# Patient Record
Sex: MALE | Race: WHITE | NOT HISPANIC OR LATINO | Employment: FULL TIME
[De-identification: names, ages, dates, MRNs, and addresses within clinical notes are randomized per-mention and may not be internally consistent; named-entity substitution may affect disease eponyms.]

---
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
## 2018-01-11 ENCOUNTER — COMMUNICATION - HEALTHEAST (OUTPATIENT)
Dept: SCHEDULING | Facility: CLINIC | Age: 18
End: 2018-01-11

## 2018-01-19 ENCOUNTER — OFFICE VISIT - HEALTHEAST (OUTPATIENT)
Dept: INTERNAL MEDICINE | Facility: CLINIC | Age: 18
End: 2018-01-19

## 2018-01-19 DIAGNOSIS — F41.9 ANXIETY: ICD-10-CM

## 2018-01-19 ASSESSMENT — MIFFLIN-ST. JEOR: SCORE: 1749.58

## 2018-01-31 ENCOUNTER — RADIANT APPOINTMENT (OUTPATIENT)
Dept: GENERAL RADIOLOGY | Facility: CLINIC | Age: 18
End: 2018-01-31
Attending: FAMILY MEDICINE
Payer: COMMERCIAL

## 2018-01-31 ENCOUNTER — OFFICE VISIT (OUTPATIENT)
Dept: URGENT CARE | Facility: URGENT CARE | Age: 18
End: 2018-01-31
Payer: COMMERCIAL

## 2018-01-31 VITALS
OXYGEN SATURATION: 99 % | HEIGHT: 72 IN | HEART RATE: 83 BPM | TEMPERATURE: 98.7 F | WEIGHT: 153 LBS | SYSTOLIC BLOOD PRESSURE: 116 MMHG | DIASTOLIC BLOOD PRESSURE: 68 MMHG | BODY MASS INDEX: 20.72 KG/M2

## 2018-01-31 DIAGNOSIS — F41.9 ANXIETY: ICD-10-CM

## 2018-01-31 DIAGNOSIS — R10.13 ABDOMINAL PAIN, EPIGASTRIC: Primary | ICD-10-CM

## 2018-01-31 DIAGNOSIS — R10.13 ABDOMINAL PAIN, EPIGASTRIC: ICD-10-CM

## 2018-01-31 LAB
BASOPHILS # BLD AUTO: 0 10E9/L (ref 0–0.2)
BASOPHILS NFR BLD AUTO: 0.2 %
DIFFERENTIAL METHOD BLD: NORMAL
EOSINOPHIL # BLD AUTO: 0.1 10E9/L (ref 0–0.7)
EOSINOPHIL NFR BLD AUTO: 1.3 %
ERYTHROCYTE [DISTWIDTH] IN BLOOD BY AUTOMATED COUNT: 11.7 % (ref 10–15)
ERYTHROCYTE [SEDIMENTATION RATE] IN BLOOD BY WESTERGREN METHOD: 5 MM/H (ref 0–15)
HCT VFR BLD AUTO: 43.7 % (ref 40–53)
HGB BLD-MCNC: 15 G/DL (ref 13.3–17.7)
LYMPHOCYTES # BLD AUTO: 2.9 10E9/L (ref 0.8–5.3)
LYMPHOCYTES NFR BLD AUTO: 31.1 %
MCH RBC QN AUTO: 29.6 PG (ref 26.5–33)
MCHC RBC AUTO-ENTMCNC: 34.3 G/DL (ref 31.5–36.5)
MCV RBC AUTO: 86 FL (ref 78–100)
MONOCYTES # BLD AUTO: 0.8 10E9/L (ref 0–1.3)
MONOCYTES NFR BLD AUTO: 8.2 %
NEUTROPHILS # BLD AUTO: 5.4 10E9/L (ref 1.6–8.3)
NEUTROPHILS NFR BLD AUTO: 59.2 %
PLATELET # BLD AUTO: 337 10E9/L (ref 150–450)
RBC # BLD AUTO: 5.06 10E12/L (ref 4.4–5.9)
WBC # BLD AUTO: 9.2 10E9/L (ref 4–11)

## 2018-01-31 PROCEDURE — 83690 ASSAY OF LIPASE: CPT | Performed by: FAMILY MEDICINE

## 2018-01-31 PROCEDURE — 74019 RADEX ABDOMEN 2 VIEWS: CPT

## 2018-01-31 PROCEDURE — 99203 OFFICE O/P NEW LOW 30 MIN: CPT | Performed by: FAMILY MEDICINE

## 2018-01-31 PROCEDURE — 80050 GENERAL HEALTH PANEL: CPT | Performed by: FAMILY MEDICINE

## 2018-01-31 PROCEDURE — 36415 COLL VENOUS BLD VENIPUNCTURE: CPT | Performed by: FAMILY MEDICINE

## 2018-01-31 PROCEDURE — 85652 RBC SED RATE AUTOMATED: CPT | Performed by: FAMILY MEDICINE

## 2018-01-31 RX ORDER — CALCIUM CARBONATE 500 MG/1
1 TABLET, CHEWABLE ORAL DAILY
COMMUNITY

## 2018-01-31 NOTE — MR AVS SNAPSHOT
After Visit Summary   1/31/2018    Glen Cardenas    MRN: [id number]           Patient Information     Date Of Birth          2000        Visit Information        Provider Department      1/31/2018 5:05 PM Janae Ritter MD [facility]        Today's Diagnoses     Abdominal pain, epigastric    -  1    Anxiety          Care Instructions      Epigastric Pain (Uncertain Cause)     Epigastric pain can be a sign of disease in the upper abdomen. Common causes include:    Acid reflux (stomach acid flowing up into the esophagus)    Gastritis (irritation of the stomach lining)    Peptic Ulcer Disease    Inflammation of the pancreas    Gallstone    Infection in the gallbladder  Pain may be dull or burning. It may spread upward to the chest or to the back. There may be other symptoms such as belching, bloating, cramps or hunger pains. There may be weight loss or poor appetite, nausea or vomiting.  Since the diagnosis of your pain is not certain yet, further tests may sometimes be needed. Sometimes the doctor will treat you for the most likely condition to see if there is improvement before doing further tests.  Home care  Medicines    Antacids help neutralize the normal acids in your stomach. Examples are Maalox, Mylanta, Rolaids, and Tums. If you don t like the liquid, you can also try a chewable one. You may find one works better than another for you. Overuse can cause diarrhea or constipation.    Acid blockers (H2 blockers) decrease acid production. Examples are cimetidine (Tagamet), famotidine (Pepcid) and ranitidine (Zantac).    Acid inhibitors (PPIs) decrease acid production in a different way than the blockers. You may find they work better, but can take a little longer to take effect.  Examples are omeprazole (Prilosec), lansoprazole (Prevacid), pantoprazole (Protonix), rabeprazole (Aciphex), and esomeprazole (Nexium).    Take an antacid 30-60 minutes after  eating and at bedtime, but not at the same time as an acid blocker.    Try not to take NSAIDs. Aspirin may also cause problems, but if taking it for your heart or other medical reasons, talk to your doctor before stopping it; you do not want to cause a worse problem, like a heart attack or stroke.  Diet    If certain foods seem to cause your spasm, try to avoid them.     Eat slowly and chew food well before swallowing. Symptoms of gastritis can be worsened by certain foods. Limit or avoid fatty, fried, and spicy foods, as well as coffee, chocolate, mint, and foods with high acid content such as tomatoes and citrus fruit and juices (orange, grapefruit, lemon).    Avoid alcohol, caffeine, and tobacco, which can delay healing and worsen your problem.    Try eating smaller meals with snacks in between  Follow-up care  Follow up with your healthcare provider or as advised.  When to seek medical advice  Call your healthcare provider right away if any of the following occur:    Stomach pain worsens or moves to the right lower part of the abdomen    Chest pain appears, or if it worsens or spreads to the chest, back, neck, shoulder, or arm    Frequent vomiting (can t keep down liquids)    Blood in the stool or vomit (red or black color)    Feeling weak or dizzy, fainting, or having trouble breathing    Fever of 100.4 F (38 C) or higher, or as directed by your healthcare provider    Abdominal swelling  Date Last Reviewed: 9/25/2015 2000-2017 [employer]. [address] [place] [address]. All rights reserved. This information is not intended as a substitute for professional medical care. Always follow your healthcare professional's instructions.                Follow-ups after your visit        Follow-up notes from your care team     Return if symptoms worsen or fail to improve.      Who to contact     If you have questions or need follow up information about today's clinic visit or your schedule  "please contact [facility] directly at [phone].  Normal or non-critical lab and imaging results will be communicated to you by MyChart, letter or phone within 4 business days after the clinic has received the results. If you do not hear from us within 7 days, please contact the clinic through MyChart or phone. If you have a critical or abnormal lab result, we will notify you by phone as soon as possible.  Submit refill requests through [employer] or call your pharmacy and they will forward the refill request to us. Please allow 3 business days for your refill to be completed.          Additional Information About Your Visit        [facility] [employer] [employer] Information     [employer] lets you send messages to your doctor, view your test results, renew your prescriptions, schedule appointments and more. To sign up, go to [email or web address] . Click on \"Log in\" on the left side of the screen, which will take you to the Welcome page. Then click on \"Sign up Now\" on the right side of the page.     You will be asked to enter the access code listed below, as well as some personal information. Please follow the directions to create your username and password.     Your access code is: [email or web address]  Expires: 2018  6:51 PM     Your access code will  in 90 days. If you need help or a new code, please call your [place] clinic or [phone].        Care EveryWhere ID     This is your Care EveryWhere ID. This could be used by other organizations to access your [place] medical records  [id number]        Your Vitals Were     Pulse Temperature Height Pulse Oximetry BMI (Body Mass Index)       83 98.7  F (37.1  C) (Oral) 6' (1.829 m) 99% 20.75 kg/m2        Blood Pressure from Last 3 Encounters:   18 116/68    Weight from Last 3 Encounters:   18 153 lb (69.4 kg) (50 %)*   09 71 lb (32.2 kg) (41 %)*   08 63 lb (28.6 kg) (38 %)*     * Growth percentiles are based on CDC 2-20 Years data.    "           We Performed the Following     CBC with platelets and differential     Comprehensive metabolic panel (BMP + Alb, Alk Phos, ALT, AST, Total. Bili, TP)     Lipase          Today's Medication Changes          These changes are accurate as of 1/31/18  6:51 PM.  If you have any questions, ask your nurse or doctor.               Start taking these medicines.        Dose/Directions    ranitidine 150 MG tablet   Commonly known as:  ZANTAC   Used for:  Abdominal pain, epigastric   Started by:  Janae Ritter MD        Dose:  150 mg   Take 1 tablet (150 mg) by mouth 2 times daily   Quantity:  60 tablet   Refills:  1            Where to get your medicines      These medications were sent to [facility] [address] [address] [place] [address]  [address] [facility] [address] [address]     Phone:  [phone]     ranitidine 150 MG tablet                Primary Care Provider Office Phone # Fax #    Edwin FLETCHER Yannick [phone]       [address]   [facility] [address]        Equal Access to Services     [facility]: Hadii aad ku hadasho Soomaali, waaxda luqadaha, qaybta kaalmada adeegyada, waxay america hayiam robb . So [facility] [phone].    ATENCIÓN: Si habla español, tiene a berman disposición servicios gratuitos de asistencia lingüística. Llame al [phone].    We comply with applicable federal civil rights laws and [place] laws. We do not discriminate on the basis of race, color, national origin, age, disability, sex, sexual orientation, or gender identity.            Thank you!     Thank you for choosing [facility]  for your care. Our goal is always to provide you with excellent care. Hearing back from our patients is one way we can continue to improve our services. Please take a few minutes to complete the written survey that you may receive in the mail after your visit with us.  Thank you!             Your Updated Medication List - Protect others around you: Learn how to safely use, store and throw away your medicines at www.disposemymeds.org.          This list is accurate as of 1/31/18  6:51 PM.  Always use your most recent med list.                   Brand Name Dispense Instructions for use Diagnosis    calcium carbonate 500 MG chewable tablet    TUMS     Take 1 chew tab by mouth daily        CITALOPRAM HYDROBROMIDE PO           ranitidine 150 MG tablet    ZANTAC    60 tablet    Take 1 tablet (150 mg) by mouth 2 times daily    Abdominal pain, epigastric

## 2018-01-31 NOTE — PROGRESS NOTES
(S) Glen Cardenas is a 17 year old male with complaint of gastrointestinal symptoms of pain in the entire abdomen for 4 weeks.  At first it began in the LLQ and now, it has settled into the upper abdomen.  The pain does worsen after eating.  The stool has occasionally been loose but sometimes he is constipated.  He has a lot of anxiety related to the relationship with his father and step-mother.  Was just started on Celexa 2 days ago. No blood in stool.  Normal UO. No h/o abdominal surgeries.  No out of country travels or recent antibiotics. He does drink coffee in the mornings.  Does drink some alcohol here and there but not excessive.  Also, does smoke on occasion.        (O) /68  Pulse 83  Temp 98.7  F (37.1  C) (Oral)  Ht 6' (1.829 m)  Wt 153 lb (69.4 kg)  SpO2 99%  BMI 20.75 kg/m2   Physical exam reveals the patient appears well. Hydration status: well hydrated. HEENT: normal.  Neck: supple, no adenopathy.  Lungs: CTA B.  CV: RRR, normal S1, S2, no murmurs.  Abdomen: +BS. abdomen is soft with mild epigastric tenderness but no masses, organomegaly or guarding.  No rebound.      Labs:   Results for orders placed or performed in visit on 01/31/18   CBC with platelets and differential   Result Value Ref Range    WBC 9.2 4.0 - 11.0 10e9/L    RBC Count 5.06 4.4 - 5.9 10e12/L    Hemoglobin 15.0 13.3 - 17.7 g/dL    Hematocrit 43.7 40.0 - 53.0 %    MCV 86 78 - 100 fl    MCH 29.6 26.5 - 33.0 pg    MCHC 34.3 31.5 - 36.5 g/dL    RDW 11.7 10.0 - 15.0 %    Platelet Count 337 150 - 450 10e9/L    Diff Method Automated Method     % Neutrophils 59.2 %    % Lymphocytes 31.1 %    % Monocytes 8.2 %    % Eosinophils 1.3 %    % Basophils 0.2 %    Absolute Neutrophil 5.4 1.6 - 8.3 10e9/L    Absolute Lymphocytes 2.9 0.8 - 5.3 10e9/L    Absolute Monocytes 0.8 0.0 - 1.3 10e9/L    Absolute Eosinophils 0.1 0.0 - 0.7 10e9/L    Absolute Basophils 0.0 0.0 - 0.2 10e9/L   **ESR FUTURE anytime   Result Value Ref Range    Sed  Rate 5 0 - 15 mm/h        Abdomen X-rays 2 views: NAD    (A) Abdominal pain, epigastric and Anxiety    (P) I have recommended bland diet and avoid gastritis triggers.  He is to avoid alcohol, coffee and smoking.  Zantac as per orders.  Await pending labs.  Return office visit with PCP if symptoms persist or worsen; I have alerted the patient to call if high fever, dehydration, marked weakness, fainting, increased abdominal pain, blood in stool or vomit.  Janae Rutherford MD

## 2018-01-31 NOTE — NURSING NOTE
Chief Complaint   Patient presents with     Urgent Care     Abdominal Pain     Reports initially intermittent, recently constant pain initially located in LLQ of abdomen, now generalized from epigastric area to RLQ and below umbilicus as well.  Rates pain 4 - 8/10.  Reports eating makes pain worse.  Has had intermittent burping/diarrhea/constipation/excessive intestinal gas.  Mom notes had acid reflux as baby and again a few years ago for which he took OTC medication.  Does have appendix and gallbladder.     Abdominal Pain     Reports decreased appetite and feels has lost weight recently.     Initial /68  Pulse 83  Temp 98.7  F (37.1  C) (Oral)  Ht 6' (1.829 m)  Wt 153 lb (69.4 kg)  SpO2 99%  BMI 20.75 kg/m2 Estimated body mass index is 20.75 kg/(m^2) as calculated from the following:    Height as of this encounter: 6' (1.829 m).    Weight as of this encounter: 153 lb (69.4 kg)..  BP completed using cuff size: small viktoria De Los Santos RN

## 2018-02-01 LAB
ALBUMIN SERPL-MCNC: 5 G/DL (ref 3.4–5)
ALP SERPL-CCNC: 121 U/L (ref 65–260)
ALT SERPL W P-5'-P-CCNC: 11 U/L (ref 0–50)
ANION GAP SERPL CALCULATED.3IONS-SCNC: 10 MMOL/L (ref 3–14)
AST SERPL W P-5'-P-CCNC: 18 U/L (ref 0–35)
BILIRUB SERPL-MCNC: 0.4 MG/DL (ref 0.2–1.3)
BUN SERPL-MCNC: 16 MG/DL (ref 7–30)
CALCIUM SERPL-MCNC: 9.7 MG/DL (ref 8.5–10.1)
CHLORIDE SERPL-SCNC: 102 MMOL/L (ref 98–110)
CO2 SERPL-SCNC: 25 MMOL/L (ref 20–32)
CREAT SERPL-MCNC: 0.83 MG/DL (ref 0.5–1)
GFR SERPL CREATININE-BSD FRML MDRD: >90 ML/MIN/1.7M2
GLUCOSE SERPL-MCNC: 75 MG/DL (ref 70–99)
LIPASE SERPL-CCNC: 94 U/L (ref 73–393)
POTASSIUM SERPL-SCNC: 3.7 MMOL/L (ref 3.4–5.3)
PROT SERPL-MCNC: 8 G/DL (ref 6.8–8.8)
SODIUM SERPL-SCNC: 137 MMOL/L (ref 133–144)
TSH SERPL DL<=0.005 MIU/L-ACNC: 2.04 MU/L (ref 0.4–4)

## 2018-02-01 NOTE — PATIENT INSTRUCTIONS
Epigastric Pain (Uncertain Cause)     Epigastric pain can be a sign of disease in the upper abdomen. Common causes include:    Acid reflux (stomach acid flowing up into the esophagus)    Gastritis (irritation of the stomach lining)    Peptic Ulcer Disease    Inflammation of the pancreas    Gallstone    Infection in the gallbladder  Pain may be dull or burning. It may spread upward to the chest or to the back. There may be other symptoms such as belching, bloating, cramps or hunger pains. There may be weight loss or poor appetite, nausea or vomiting.  Since the diagnosis of your pain is not certain yet, further tests may sometimes be needed. Sometimes the doctor will treat you for the most likely condition to see if there is improvement before doing further tests.  Home care  Medicines    Antacids help neutralize the normal acids in your stomach. Examples are Maalox, Mylanta, Rolaids, and Tums. If you don t like the liquid, you can also try a chewable one. You may find one works better than another for you. Overuse can cause diarrhea or constipation.    Acid blockers (H2 blockers) decrease acid production. Examples are cimetidine (Tagamet), famotidine (Pepcid) and ranitidine (Zantac).    Acid inhibitors (PPIs) decrease acid production in a different way than the blockers. You may find they work better, but can take a little longer to take effect.  Examples are omeprazole (Prilosec), lansoprazole (Prevacid), pantoprazole (Protonix), rabeprazole (Aciphex), and esomeprazole (Nexium).    Take an antacid 30-60 minutes after eating and at bedtime, but not at the same time as an acid blocker.    Try not to take NSAIDs. Aspirin may also cause problems, but if taking it for your heart or other medical reasons, talk to your doctor before stopping it; you do not want to cause a worse problem, like a heart attack or stroke.  Diet    If certain foods seem to cause your spasm, try to avoid them.     Eat slowly and chew food well  before swallowing. Symptoms of gastritis can be worsened by certain foods. Limit or avoid fatty, fried, and spicy foods, as well as coffee, chocolate, mint, and foods with high acid content such as tomatoes and citrus fruit and juices (orange, grapefruit, lemon).    Avoid alcohol, caffeine, and tobacco, which can delay healing and worsen your problem.    Try eating smaller meals with snacks in between  Follow-up care  Follow up with your healthcare provider or as advised.  When to seek medical advice  Call your healthcare provider right away if any of the following occur:    Stomach pain worsens or moves to the right lower part of the abdomen    Chest pain appears, or if it worsens or spreads to the chest, back, neck, shoulder, or arm    Frequent vomiting (can t keep down liquids)    Blood in the stool or vomit (red or black color)    Feeling weak or dizzy, fainting, or having trouble breathing    Fever of 100.4 F (38 C) or higher, or as directed by your healthcare provider    Abdominal swelling  Date Last Reviewed: 9/25/2015 2000-2017 [employer]. [address] [place] [address], [place] [address]. All rights reserved. This information is not intended as a substitute for professional medical care. Always follow your healthcare professional's instructions.

## 2018-02-22 ENCOUNTER — RECORDS - HEALTHEAST (OUTPATIENT)
Dept: ADMINISTRATIVE | Facility: OTHER | Age: 18
End: 2018-02-22

## 2018-02-28 ENCOUNTER — COMMUNICATION - HEALTHEAST (OUTPATIENT)
Dept: INTERNAL MEDICINE | Facility: CLINIC | Age: 18
End: 2018-02-28

## 2018-02-28 DIAGNOSIS — F41.9 ANXIETY: ICD-10-CM

## 2018-03-06 ENCOUNTER — RECORDS - HEALTHEAST (OUTPATIENT)
Dept: ADMINISTRATIVE | Facility: OTHER | Age: 18
End: 2018-03-06

## 2018-03-19 ENCOUNTER — RECORDS - HEALTHEAST (OUTPATIENT)
Dept: ADMINISTRATIVE | Facility: OTHER | Age: 18
End: 2018-03-19

## 2018-03-30 ENCOUNTER — COMMUNICATION - HEALTHEAST (OUTPATIENT)
Dept: TELEHEALTH | Facility: CLINIC | Age: 18
End: 2018-03-30

## 2018-03-30 ENCOUNTER — OFFICE VISIT - HEALTHEAST (OUTPATIENT)
Dept: INTERNAL MEDICINE | Facility: CLINIC | Age: 18
End: 2018-03-30

## 2018-03-30 DIAGNOSIS — R53.83 FATIGUE: ICD-10-CM

## 2018-03-30 DIAGNOSIS — F41.9 ANXIETY: ICD-10-CM

## 2018-03-30 LAB
ALBUMIN SERPL-MCNC: 4.2 G/DL (ref 3.5–5)
ALP SERPL-CCNC: 107 U/L (ref 45–120)
ALT SERPL W P-5'-P-CCNC: <9 U/L (ref 0–45)
ANION GAP SERPL CALCULATED.3IONS-SCNC: 10 MMOL/L (ref 5–18)
AST SERPL W P-5'-P-CCNC: 15 U/L (ref 0–40)
BILIRUB SERPL-MCNC: 0.8 MG/DL (ref 0–1)
BUN SERPL-MCNC: 14 MG/DL (ref 8–22)
C REACTIVE PROTEIN LHE: <0.1 MG/DL (ref 0–0.8)
CALCIUM SERPL-MCNC: 10 MG/DL (ref 8.5–10.5)
CHLORIDE BLD-SCNC: 102 MMOL/L (ref 98–107)
CO2 SERPL-SCNC: 25 MMOL/L (ref 22–31)
CREAT SERPL-MCNC: 0.77 MG/DL (ref 0.7–1.3)
ERYTHROCYTE [DISTWIDTH] IN BLOOD BY AUTOMATED COUNT: 12.2 % (ref 11–14.5)
ERYTHROCYTE [SEDIMENTATION RATE] IN BLOOD BY WESTERGREN METHOD: 4 MM/HR (ref 0–15)
GFR SERPL CREATININE-BSD FRML MDRD: >60 ML/MIN/1.73M2
GLUCOSE BLD-MCNC: 77 MG/DL (ref 70–125)
HCT VFR BLD AUTO: 42.3 % (ref 40–54)
HGB BLD-MCNC: 14.5 G/DL (ref 14–18)
MCH RBC QN AUTO: 29.7 PG (ref 27–34)
MCHC RBC AUTO-ENTMCNC: 34.3 G/DL (ref 32–36)
MCV RBC AUTO: 87 FL (ref 80–100)
MONOCYTES NFR BLD AUTO: NEGATIVE %
PLATELET # BLD AUTO: 293 THOU/UL (ref 140–440)
PMV BLD AUTO: 6.9 FL (ref 7–10)
POTASSIUM BLD-SCNC: 4.3 MMOL/L (ref 3.5–5)
PROT SERPL-MCNC: 7.4 G/DL (ref 6–8)
RBC # BLD AUTO: 4.88 MILL/UL (ref 4.4–6.2)
SODIUM SERPL-SCNC: 137 MMOL/L (ref 136–145)
TSH SERPL DL<=0.005 MIU/L-ACNC: 1.15 UIU/ML (ref 0.3–5)
WBC: 5.8 THOU/UL (ref 4–11)

## 2018-04-02 LAB
25(OH)D3 SERPL-MCNC: 26.1 NG/ML (ref 30–80)
25(OH)D3 SERPL-MCNC: 26.1 NG/ML (ref 30–80)

## 2018-04-10 ENCOUNTER — RECORDS - HEALTHEAST (OUTPATIENT)
Dept: ADMINISTRATIVE | Facility: OTHER | Age: 18
End: 2018-04-10

## 2018-07-31 ENCOUNTER — OFFICE VISIT - HEALTHEAST (OUTPATIENT)
Dept: INTERNAL MEDICINE | Facility: CLINIC | Age: 18
End: 2018-07-31

## 2018-07-31 DIAGNOSIS — J01.00 ACUTE NON-RECURRENT MAXILLARY SINUSITIS: ICD-10-CM

## 2018-08-01 ENCOUNTER — COMMUNICATION - HEALTHEAST (OUTPATIENT)
Dept: INTERNAL MEDICINE | Facility: CLINIC | Age: 18
End: 2018-08-01

## 2018-08-24 ENCOUNTER — COMMUNICATION - HEALTHEAST (OUTPATIENT)
Dept: SCHEDULING | Facility: CLINIC | Age: 18
End: 2018-08-24

## 2018-09-29 ENCOUNTER — COMMUNICATION - HEALTHEAST (OUTPATIENT)
Dept: INTERNAL MEDICINE | Facility: CLINIC | Age: 18
End: 2018-09-29

## 2018-09-29 DIAGNOSIS — F41.9 ANXIETY: ICD-10-CM

## 2019-03-27 ENCOUNTER — OFFICE VISIT - HEALTHEAST (OUTPATIENT)
Dept: FAMILY MEDICINE | Facility: CLINIC | Age: 19
End: 2019-03-27

## 2019-03-27 DIAGNOSIS — T50.905A ADVERSE EFFECT OF DRUG, INITIAL ENCOUNTER: ICD-10-CM

## 2019-04-27 ENCOUNTER — COMMUNICATION - HEALTHEAST (OUTPATIENT)
Dept: FAMILY MEDICINE | Facility: CLINIC | Age: 19
End: 2019-04-27

## 2019-04-27 DIAGNOSIS — T50.905A ADVERSE EFFECT OF DRUG, INITIAL ENCOUNTER: ICD-10-CM

## 2019-06-05 ENCOUNTER — COMMUNICATION - HEALTHEAST (OUTPATIENT)
Dept: FAMILY MEDICINE | Facility: CLINIC | Age: 19
End: 2019-06-05

## 2019-06-05 ENCOUNTER — RECORDS - HEALTHEAST (OUTPATIENT)
Dept: ADMINISTRATIVE | Facility: OTHER | Age: 19
End: 2019-06-05

## 2019-06-05 DIAGNOSIS — T50.905A ADVERSE EFFECT OF DRUG, INITIAL ENCOUNTER: ICD-10-CM

## 2019-06-09 ENCOUNTER — COMMUNICATION - HEALTHEAST (OUTPATIENT)
Dept: SCHEDULING | Facility: CLINIC | Age: 19
End: 2019-06-09

## 2019-06-10 ENCOUNTER — AMBULATORY - HEALTHEAST (OUTPATIENT)
Dept: INTERNAL MEDICINE | Facility: CLINIC | Age: 19
End: 2019-06-10

## 2019-06-10 DIAGNOSIS — T50.905A ADVERSE EFFECT OF DRUG, INITIAL ENCOUNTER: ICD-10-CM

## 2019-06-11 ENCOUNTER — COMMUNICATION - HEALTHEAST (OUTPATIENT)
Dept: INTERNAL MEDICINE | Facility: CLINIC | Age: 19
End: 2019-06-11

## 2019-06-19 ENCOUNTER — RECORDS - HEALTHEAST (OUTPATIENT)
Dept: ADMINISTRATIVE | Facility: OTHER | Age: 19
End: 2019-06-19

## 2019-06-27 ENCOUNTER — OFFICE VISIT - HEALTHEAST (OUTPATIENT)
Dept: INTERNAL MEDICINE | Facility: CLINIC | Age: 19
End: 2019-06-27

## 2019-06-27 DIAGNOSIS — F41.9 ANXIETY: ICD-10-CM

## 2019-07-01 ENCOUNTER — RECORDS - HEALTHEAST (OUTPATIENT)
Dept: ADMINISTRATIVE | Facility: OTHER | Age: 19
End: 2019-07-01

## 2020-03-02 ENCOUNTER — OFFICE VISIT - HEALTHEAST (OUTPATIENT)
Dept: INTERNAL MEDICINE | Facility: CLINIC | Age: 20
End: 2020-03-02

## 2020-03-02 DIAGNOSIS — F41.9 ANXIETY: ICD-10-CM

## 2020-03-02 DIAGNOSIS — Z00.00 ROUTINE GENERAL MEDICAL EXAMINATION AT A HEALTH CARE FACILITY: ICD-10-CM

## 2020-03-02 ASSESSMENT — PATIENT HEALTH QUESTIONNAIRE - PHQ9: SUM OF ALL RESPONSES TO PHQ QUESTIONS 1-9: 7

## 2020-03-02 ASSESSMENT — MIFFLIN-ST. JEOR: SCORE: 1763.19

## 2020-07-26 ENCOUNTER — COMMUNICATION - HEALTHEAST (OUTPATIENT)
Dept: INTERNAL MEDICINE | Facility: CLINIC | Age: 20
End: 2020-07-26

## 2020-07-26 DIAGNOSIS — F41.9 ANXIETY: ICD-10-CM

## 2020-10-13 ENCOUNTER — COMMUNICATION - HEALTHEAST (OUTPATIENT)
Dept: INTERNAL MEDICINE | Facility: CLINIC | Age: 20
End: 2020-10-13

## 2020-10-19 ENCOUNTER — COMMUNICATION - HEALTHEAST (OUTPATIENT)
Dept: INTERNAL MEDICINE | Facility: CLINIC | Age: 20
End: 2020-10-19

## 2020-10-20 ENCOUNTER — OFFICE VISIT - HEALTHEAST (OUTPATIENT)
Dept: INTERNAL MEDICINE | Facility: CLINIC | Age: 20
End: 2020-10-20

## 2020-10-20 DIAGNOSIS — Z23 NEED FOR IMMUNIZATION AGAINST INFLUENZA: ICD-10-CM

## 2020-10-20 DIAGNOSIS — S06.0X0A CONCUSSION WITHOUT LOSS OF CONSCIOUSNESS, INITIAL ENCOUNTER: ICD-10-CM

## 2020-11-05 ENCOUNTER — OFFICE VISIT - HEALTHEAST (OUTPATIENT)
Dept: INTERNAL MEDICINE | Facility: CLINIC | Age: 20
End: 2020-11-05

## 2020-11-05 DIAGNOSIS — S06.0X0A CONCUSSION WITHOUT LOSS OF CONSCIOUSNESS, INITIAL ENCOUNTER: ICD-10-CM

## 2020-11-17 ENCOUNTER — OFFICE VISIT - HEALTHEAST (OUTPATIENT)
Dept: INTERNAL MEDICINE | Facility: CLINIC | Age: 20
End: 2020-11-17

## 2020-11-17 DIAGNOSIS — F41.8 DEPRESSION WITH ANXIETY: ICD-10-CM

## 2020-12-06 ENCOUNTER — OFFICE VISIT (OUTPATIENT)
Dept: URGENT CARE | Facility: URGENT CARE | Age: 20
End: 2020-12-06
Payer: COMMERCIAL

## 2020-12-06 ENCOUNTER — COMMUNICATION - HEALTHEAST (OUTPATIENT)
Dept: SCHEDULING | Facility: CLINIC | Age: 20
End: 2020-12-06

## 2020-12-06 VITALS
OXYGEN SATURATION: 97 % | TEMPERATURE: 97.3 F | WEIGHT: 172 LBS | SYSTOLIC BLOOD PRESSURE: 116 MMHG | DIASTOLIC BLOOD PRESSURE: 81 MMHG | BODY MASS INDEX: 23.33 KG/M2 | HEART RATE: 83 BPM

## 2020-12-06 DIAGNOSIS — F32.2 SEVERE DEPRESSION (H): Primary | ICD-10-CM

## 2020-12-06 PROCEDURE — 99207 PR NO CHARGE LOS: CPT | Performed by: FAMILY MEDICINE

## 2020-12-06 NOTE — PROGRESS NOTES
THIS IS A TRIAGE ENCOUNTER.   Glen Cardenas is a 20 year old male--his primary care provider is Dr. Edwin Lanier at the [facility]-- with a history of depression and anxiety here with his father.  Patient presents with worsening emotional outbursts, anger, severe anxiety and worsening hopelessness, sadness, troubles staying asleep, suicidal ideation without a plan and has not noticed improvement after his Citalopram (Celexa) was increased from 20 mg daily to 40 mg daily on November 18, 2020.  History of self-inflicted head trauma leading to concussion in late September/early October 2020.     Patient will go to the [facility] ED to be evaluated by the Psychiatric Assessment Team .      I told the patient that the patient would not be charged for the brief triage evaluation here at the [facility].     Victoriano Sinha MD

## 2020-12-07 ENCOUNTER — HOSPITAL ENCOUNTER (EMERGENCY)
Facility: CLINIC | Age: 20
Discharge: HOME | End: 2020-12-07
Attending: EMERGENCY MEDICINE | Admitting: EMERGENCY MEDICINE
Payer: COMMERCIAL

## 2020-12-07 VITALS
OXYGEN SATURATION: 99 % | DIASTOLIC BLOOD PRESSURE: 78 MMHG | HEART RATE: 66 BPM | RESPIRATION RATE: 16 BRPM | TEMPERATURE: 97.9 F | SYSTOLIC BLOOD PRESSURE: 139 MMHG

## 2020-12-07 DIAGNOSIS — F41.8 DEPRESSION WITH ANXIETY: ICD-10-CM

## 2020-12-07 PROCEDURE — 99285 EMERGENCY DEPT VISIT HI MDM: CPT | Mod: 25 | Performed by: EMERGENCY MEDICINE

## 2020-12-07 PROCEDURE — 90791 PSYCH DIAGNOSTIC EVALUATION: CPT

## 2020-12-07 PROCEDURE — 99283 EMERGENCY DEPT VISIT LOW MDM: CPT | Performed by: EMERGENCY MEDICINE

## 2020-12-07 NOTE — ED PROVIDER NOTES
ED Provider Note  [facility]      History     Chief Complaint   Patient presents with     Mental Health Problem     feeling down, lots of stressors      The history is provided by the patient and medical records.     Glen Cardenas is a 20 year old male who presents to the Emergency Department with his father with increased depression and anxiety. Patient reports he has been dealing with depression and anxiety for a few years but it has been much worse lately. He states he has been having breakdowns, crying, and has been pacing for hours at times. He has been angry and swearing. Patient reports he has been able to fall asleep but wakes up in the middle of the night sweating and panicking. Patient has also been having a hard time focusing in school and has been falling behind. He was previously going to school in [place] but was unable to manage well and now goes to the [facility] to be closer to family support. Patient reports at the end of September he got angry and hit himself in the head, giving himself a concussion. He reports passive suicidal thoughts but states he would not follow through as he loves his family too much. No suicide intent or plan. Patient reports stressors of family conflict, breaking up with his girlfriend, and the death of one of his closest friends due to lung cancer in 2019. Patient has been taking celexa for the past 2-3 years. It was increased to 40 mg a couple of weeks ago and patient reports he has been worse since. Patient reports he has gone to a therapist in the past and liked his therapist but it interfered with his work schedule so he stopped going. Patient reports daily marijuana use but has not used for 2 days. He reports some social alcohol but no other drug use.     He denies any new medical complaints here.  Denies any fever, cough, chest pain, shortness breath abdominal pain, nausea, vomiting, diarrhea.  Denies any known exposure to  COVID-19.    Past Medical History  History reviewed. No pertinent past medical history.  History reviewed. No pertinent surgical history.       CITALOPRAM HYDROBROMIDE PO       ARIPiprazole (ABILIFY) 2 MG tablet       calcium carbonate (TUMS) 500 MG chewable tablet       ranitidine (ZANTAC) 150 MG tablet      No Known Allergies  Family History  Family History   Problem Relation Age of Onset     Depression Maternal Aunt      Anxiety Disorder Maternal Aunt      Substance Abuse Maternal Aunt      Social History   Social History     Tobacco Use     Smoking status: Current Every Day Smoker     Types: Vaping Device     Smokeless tobacco: Never Used     Tobacco comment: daily   Substance Use Topics     Alcohol use: Yes     Comment: occasional      Drug use: Yes     Types: Marijuana     Comment: daily      Past medical history, past surgical history, medications, allergies, family history, and social history were reviewed with the patient. No additional pertinent items.       Review of Systems  A complete review of systems was performed with pertinent positives and negatives noted in the HPI, and all other systems negative.    Physical Exam   BP: 139/78  Pulse: 66  Temp: 97.9  F (36.6  C)  Resp: 16  SpO2: 99 %  Physical Exam  Vitals signs reviewed.   Constitutional:       General: He is not in acute distress.     Appearance: He is well-developed.   HENT:      Head: Normocephalic and atraumatic.      Nose: Nose normal.      Mouth/Throat:      Mouth: Mucous membranes are moist.   Eyes:      Extraocular Movements: Extraocular movements intact.      Conjunctiva/sclera: Conjunctivae normal.      Pupils: Pupils are equal, round, and reactive to light.   Neck:      Musculoskeletal: Normal range of motion and neck supple.   Cardiovascular:      Rate and Rhythm: Normal rate and regular rhythm.      Pulses: Normal pulses.      Heart sounds: Normal heart sounds. No murmur.   Pulmonary:      Effort: Pulmonary effort is normal. No  "respiratory distress.      Breath sounds: Normal breath sounds. No stridor. No wheezing or rales.   Abdominal:      General: Bowel sounds are normal. There is no distension.      Palpations: Abdomen is soft. There is no mass.      Tenderness: There is no abdominal tenderness. There is no guarding or rebound.   Musculoskeletal: Normal range of motion.   Skin:     General: Skin is warm and dry.      Capillary Refill: Capillary refill takes less than 2 seconds.      Findings: No rash.   Neurological:      General: No focal deficit present.      Mental Status: He is alert and oriented to person, place, and time.      GCS: GCS eye subscore is 4. GCS verbal subscore is 5. GCS motor subscore is 6.      Cranial Nerves: No cranial nerve deficit.      Sensory: No sensory deficit.      Motor: No weakness or abnormal muscle tone.   Psychiatric:      Comments: Patient is calm and cooperative here. He denies any current suicidal ideation, plan, or intent. No homicidal ideation. He does not appear to have any signs of responding to external stimuli. He has good insight into his illness and is future goal oriented. He is committed to \"getting back on the right track.\"          ED Course      Procedures           No results found for any visits on 12/07/20.  Medications - No data to display     Assessments & Plan (with Medical Decision Making)   Patient presents for evaluation of worsening depression and anxiety.  He does have history of this and has been on medication that he feels is not helping.  He has had some passive suicidal ideation but denies any intent or plan and denies any current suicidal ideation.  He has had some recent stressors making things harder for him and he feels that he needs to \"get back on the right track\".  He is calm and cooperative here and clearly has good insight into his illness and issues.  He was able to contract for safety with me as well as with the behavioral health .  He was assessed by " Mena the behavioral health .  Please see her separate note.  In discussion it was felt that he was stable for outpatient partial treatment and was also given close follow-up tomorrow for medication management.  As he is not actively suicidal and can contract for safety and has no signs of psychosis we do feel that close outpatient follow-up is appropriate and he does have good insight and is future oriented and wants to improve his overall situation and was very happy with these resources as well.  He is not acutely anxious here and does not warrant any current medication treatment.  He has no medical complaints does not warrant any further medical work-up at this time.    Patient had an appointment made for him for tomorrow for medication management as well as referral with intake phone call on Wednesday for adult day/partial treatment which he felt would be very beneficial and he was in agreement with the plan.  He was able to contract for safety here and stated that he came here looking for resources which she has now been provided and feels very comfortable going back home with his father.  He will follow closely with these resources.  He was given indications for return to the emergency department.  He voiced understanding was comfortable with this plan.  He was discharged home in stable condition.    I have reviewed the nursing notes. I have reviewed the findings, diagnosis, plan and need for follow up with the patient.    Discharge Medication List as of 12/7/2020 11:09 AM          Final diagnoses:   Depression with anxiety     IEsther, am serving as a trained medical scribe to document services personally performed by Marzena Godoy MD, based on the provider's statements to me.      IMarzena MD, was physically present and have reviewed and verified the accuracy of this note documented by Esther Montemayor.      --  Marzena Godoy MD  [facility] located within [facility] EMERGENCY  DEPARTMENT  12/7/2020     Marzena Godoy MD  12/11/20 2017
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
## 2020-12-07 NOTE — ED NOTES
Patient discharged accompanied by Dad. All belongings returned to patient upon discharge. Follow up appointments reviewed with patient and paperwork given to patient from .

## 2020-12-07 NOTE — ED AVS SNAPSHOT
[facility]  [address]  [place] [address]  Phone: [phone]  Fax: [phone]                                    Glen Cardenas   MRN: [id number]    Department: [facility]   Date of Visit: 12/7/2020           After Visit Summary Signature Page    I have received my discharge instructions, and my questions have been answered. I have discussed any challenges I see with this plan with the nurse or doctor.    ..........................................................................................................................................  Patient/Patient Representative Signature      ..........................................................................................................................................  Patient Representative Print Name and Relationship to Patient    ..................................................               ................................................  Date                                   Time    ..........................................................................................................................................  Reviewed by Signature/Title    ...................................................              ..............................................  Date                                               Time          22EPIC Rev 08/18

## 2020-12-07 NOTE — DISCHARGE INSTRUCTIONS
Follow up with the resources as discussed with the  - day/partial treatment, psychiatrist and therapist.     Return for any new or worsening concerns.     Please make an appointment to follow up with your therapist and/or Psychiatric Clinic (phone: [phone]) within 1-3 days.     Return to the ED if you are having worsening thoughts/feelings of harming yourself or others, or any urgent/life-threatening concerns.     DISCHARGE RESOURCES:  -[facility] [phone]   -[facility] [phone] or [phone].  -Crisis Intervention: [phone] or [phone] (TTY: [phone]).  Call anytime.  -Suicide Awareness Voices of Education (SAVE) (www.save.org): 115-404-GFTL (5704)  -National Suicide Prevention Line (www.mentalhealthmn.org): 548-352-TLHM (5747)  -[employer] [place] on Mental Illness (www.[place].ginger.org): 220.750.1744 or 486-691-1650.  -Blvx5llwo: text the word LIFE to 62435 for immediate support and crisis intervention  -Mental Health Consumer/Survivor Network of [place] (www.mhcsn.net): 130.104.6999 or 508-340-2016  -[employer] [place] (www.mentalhealth.org): 528.657.7414 or 141-008-4980

## 2020-12-09 ENCOUNTER — HOSPITAL ENCOUNTER (OUTPATIENT)
Dept: BEHAVIORAL HEALTH | Facility: CLINIC | Age: 20
Discharge: HOME | End: 2020-12-09
Attending: FAMILY MEDICINE | Admitting: FAMILY MEDICINE
Payer: COMMERCIAL

## 2020-12-09 ENCOUNTER — BEH TREATMENT PLAN (OUTPATIENT)
Dept: BEHAVIORAL HEALTH | Facility: CLINIC | Age: 20
End: 2020-12-09
Attending: PSYCHIATRY & NEUROLOGY

## 2020-12-09 DIAGNOSIS — F33.1 MAJOR DEPRESSIVE DISORDER, RECURRENT EPISODE, MODERATE WITH ANXIOUS DISTRESS (H): ICD-10-CM

## 2020-12-09 PROCEDURE — 90791 PSYCH DIAGNOSTIC EVALUATION: CPT | Mod: 95 | Performed by: PSYCHOLOGIST

## 2020-12-09 RX ORDER — ARIPIPRAZOLE 2 MG/1
2 TABLET ORAL DAILY
COMMUNITY
End: 2023-09-24

## 2020-12-09 ASSESSMENT — COLUMBIA-SUICIDE SEVERITY RATING SCALE - C-SSRS
REASONS FOR IDEATION LIFETIME: MOSTLY TO END OR STOP THE PAIN (YOU COULDN'T GO ON LIVING WITH THE PAIN OR HOW YOU WERE FEELING)
1. IN THE PAST MONTH, HAVE YOU WISHED YOU WERE DEAD OR WISHED YOU COULD GO TO SLEEP AND NOT WAKE UP?: YES
ATTEMPT LIFETIME: NO
3. HAVE YOU BEEN THINKING ABOUT HOW YOU MIGHT KILL YOURSELF?: NO
1. IN THE PAST MONTH, HAVE YOU WISHED YOU WERE DEAD OR WISHED YOU COULD GO TO SLEEP AND NOT WAKE UP?: YES
REASONS FOR IDEATION PAST MONTH: MOSTLY TO END OR STOP THE PAIN (YOU COULDN'T GO ON LIVING WITH THE PAIN OR HOW YOU WERE FEELING)
5. HAVE YOU STARTED TO WORK OUT OR WORKED OUT THE DETAILS OF HOW TO KILL YOURSELF? DO YOU INTEND TO CARRY OUT THIS PLAN?: NO
TOTAL  NUMBER OF INTERRUPTED ATTEMPTS LIFETIME: NO
ATTEMPT PAST THREE MONTHS: NO
6. HAVE YOU EVER DONE ANYTHING, STARTED TO DO ANYTHING, OR PREPARED TO DO ANYTHING TO END YOUR LIFE?: NO
TOTAL  NUMBER OF INTERRUPTED ATTEMPTS PAST 3 MONTHS: NO
TOTAL  NUMBER OF ABORTED OR SELF INTERRUPTED ATTEMPTS PAST LIFETIME: NO
2. HAVE YOU ACTUALLY HAD ANY THOUGHTS OF KILLING YOURSELF?: NO
TOTAL  NUMBER OF ABORTED OR SELF INTERRUPTED ATTEMPTS PAST 3 MONTHS: NO
4. HAVE YOU HAD THESE THOUGHTS AND HAD SOME INTENTION OF ACTING ON THEM?: NO
5. HAVE YOU STARTED TO WORK OUT OR WORKED OUT THE DETAILS OF HOW TO KILL YOURSELF? DO YOU INTEND TO CARRY OUT THIS PLAN?: NO
2. HAVE YOU ACTUALLY HAD ANY THOUGHTS OF KILLING YOURSELF LIFETIME?: NO
4. HAVE YOU HAD THESE THOUGHTS AND HAD SOME INTENTION OF ACTING ON THEM?: NO
6. HAVE YOU EVER DONE ANYTHING, STARTED TO DO ANYTHING, OR PREPARED TO DO ANYTHING TO END YOUR LIFE?: NO

## 2020-12-09 ASSESSMENT — ANXIETY QUESTIONNAIRES
GAD7 TOTAL SCORE: 20
IF YOU CHECKED OFF ANY PROBLEMS ON THIS QUESTIONNAIRE, HOW DIFFICULT HAVE THESE PROBLEMS MADE IT FOR YOU TO DO YOUR WORK, TAKE CARE OF THINGS AT HOME, OR GET ALONG WITH OTHER PEOPLE: VERY DIFFICULT
7. FEELING AFRAID AS IF SOMETHING AWFUL MIGHT HAPPEN: MORE THAN HALF THE DAYS
3. WORRYING TOO MUCH ABOUT DIFFERENT THINGS: NEARLY EVERY DAY
2. NOT BEING ABLE TO STOP OR CONTROL WORRYING: NEARLY EVERY DAY
6. BECOMING EASILY ANNOYED OR IRRITABLE: NEARLY EVERY DAY
5. BEING SO RESTLESS THAT IT IS HARD TO SIT STILL: NEARLY EVERY DAY
1. FEELING NERVOUS, ANXIOUS, OR ON EDGE: NEARLY EVERY DAY

## 2020-12-09 ASSESSMENT — PATIENT HEALTH QUESTIONNAIRE - PHQ9
SUM OF ALL RESPONSES TO PHQ QUESTIONS 1-9: 20
5. POOR APPETITE OR OVEREATING: NEARLY EVERY DAY

## 2020-12-09 NOTE — PROGRESS NOTES
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 worker, loyal .     Recommendations:     1. Plan for Safety and Risk Management:   A safety and risk management plan has been developed including: Patient consented to co-developed safety plan.  Safety and risk management plan was completed.  Patient agreed to use safety plan should any safety concerns arise.  A copy was given to the patient..          Report to child / adult protection services was NA.     2. Patient identified no cultural or spiritual concerns for treatment services. Patient encouraged to ask for help should needs arise in the course of treatment.        3. Initial Treatment will focus on:    working on managing thinking to communicate effectively,  work on skills to control his own happiness,  work at putting himself first to manage his depression and anxiety.     4. Resources/Service Plan:    services are not indicated.   Modifications to assist communication are not indicated.   Additional disability accommodations are not indicated.      5. Collaboration:   Collaboration / coordination of treatment will be initiated with the following  support professionals: outpatient therapist and psychiatry.      6.  Referrals:   The following referral(s) will be initiated:  Partial Hospitalization Program. Next Scheduled Appointment: 12/11/20.     A Release of Information has been obtained for the following: emergency contact; father.    7. GUZMAN:    GUZMAN:  Discussed the general effects of drugs and alcohol on health and well-being.  Recommendations:  Would be important to keep use in mind in treatment planning, engaging skills to manage stress and improve sleep as those seem to be main triggers for use for patient .   .     8. Records:   These were reviewed at time of assessment.   Information in this assessment was obtained from the medical record and  provided by patient who is a good historian.    Patient will have open access to their mental health medical record.      Provider Name/ Credentials:  Dr. Virginia Rico PSYD, LP   December 9, 2020

## 2020-12-10 ENCOUNTER — TELEPHONE (OUTPATIENT)
Dept: BEHAVIORAL HEALTH | Facility: CLINIC | Age: 20
End: 2020-12-10

## 2020-12-10 ASSESSMENT — ANXIETY QUESTIONNAIRES
IF YOU CHECKED OFF ANY PROBLEMS ON THIS QUESTIONNAIRE, HOW DIFFICULT HAVE THESE PROBLEMS MADE IT FOR YOU TO DO YOUR WORK, TAKE CARE OF THINGS AT HOME, OR GET ALONG WITH OTHER PEOPLE: SOMEWHAT DIFFICULT
1. FEELING NERVOUS, ANXIOUS, OR ON EDGE: NEARLY EVERY DAY
GAD7 TOTAL SCORE: 20
2. NOT BEING ABLE TO STOP OR CONTROL WORRYING: NEARLY EVERY DAY
3. WORRYING TOO MUCH ABOUT DIFFERENT THINGS: NEARLY EVERY DAY
7. FEELING AFRAID AS IF SOMETHING AWFUL MIGHT HAPPEN: MORE THAN HALF THE DAYS
5. BEING SO RESTLESS THAT IT IS HARD TO SIT STILL: NEARLY EVERY DAY
GAD7 TOTAL SCORE: 20
6. BECOMING EASILY ANNOYED OR IRRITABLE: NEARLY EVERY DAY

## 2020-12-10 ASSESSMENT — PATIENT HEALTH QUESTIONNAIRE - PHQ9
5. POOR APPETITE OR OVEREATING: NEARLY EVERY DAY
SUM OF ALL RESPONSES TO PHQ QUESTIONS 1-9: 19

## 2020-12-10 NOTE — TELEPHONE ENCOUNTER
"RN Review of Medical History / Physical Health Screen  Outpatient Mental Health Programs - [facility] Adult Partial Hospitalization Program    PATIENT'S NAME: Glen Cardenas  MRN:   [id number]  :   2000  ACCT. NUMBER: [id number]  CURRENT AGE:  20 year old    DATE OF DIAGNOSTIC ASSESSMENT: 20  DATE OF ADMISSION: 20     Please see Diagnostic Assessment for additional Medical History.     General Health:   Have you had any exposure to any communicable disease in the past 2-3 weeks? yes - exposed to Covid  Had testing     Are you aware of safe sex practices? yes       Nutrition:    Are you on a special diet? If yes, please explain:  no   Do you have any concerns regarding your nutritional status? If yes, please explain:  no   Have you had any appetite changes in the last 3 months?  No     Have you had any weight loss or weight gain in the last 3 months?  No     Do you have a history of an eating disorder? no   Do you have a history of being in an eating disorder program? no     Patient height and weight recorded by RN in epic flowsheet: no    No; Unable to measure  Because of temporary in-person programmatic suspension due to COVID-19 pandemic, all pt weights and heights will be collected through patient self-report an recorded in physical health screening progress note upon admission to the program.                            Height/Weight Review:  Patient reported height:     6'1\"   Patient reports weight:  Date last checked:  175lb   Any referrals/needs identified?  no        BMI Review:  Was the patient informed of BMI? no      Findings  No Intervention         Fall Risk:   Have you had any falls in the past 3 months? no     Do you currently use any assistive devices for mobility?   no      Additional Comments/Assessment: denies any current medical concerns    Per completion of the Medical History / Physical Health Screen, is there a recommendation to see / follow up with a " primary care physician/clinic or dentist?    No.      Hallie Tierney RN  12/10/2020

## 2020-12-10 NOTE — TELEPHONE ENCOUNTER
"                  Outpatient Mental Health Services - Adult     MY COPING PLAN FOR SAFETY     PATIENT'S NAME:    Glen Cardenas  MRN:                           [id number]     SAFETY PLAN:     Step 1: Warning signs / cues (Thoughts, images, mood, situation, behavior) that a crisis may be developing:     ? Thoughts: \"No one would care if I was dead\"  ? Images: everything goes black like there is a tornado in my head  ? Thinking Processes: racing thoughts  ? Mood: intense anger and intense worry  ? Behaviors: argues and yells at others  ? Situations: changes in symptoms: increase in anxiety and relationship problems   ?    Step 2: Coping strategies - Things I can do to take my mind off of my problems without contacting another person (relaxation technique, physical activity):     ? Distress Tolerance Strategies:  nneds to idenfity  ? Physical Activities: hot shower, exercise  ? Focus on helpful thoughts:  needs to identify     Step 3: People and social settings that provide distraction:                 Name: friend Jignesh, grandpa, father              walk around campus, being outside        Step 4: Remind myself of people and things that are important to me and worth living for:  Mother, father, family     Step 5: When I am in crisis, I can ask these people to help me use my safety plan:                 Name: mother or father                Step 6: Making the environment safe:      ? be around others     Step 7: Professionals or agencies I can contact during a crisis:     ? Suicide Prevention Lifeline: 3-563-939-TALK (5073)  ? Crisis Text Line Service (available 24 hours a day, 7 days a week): Text [place] to 393140  ? Call  **CRISIS (705668) from a cell phone to talk to a team of professionals who can help you.          Crisis Services By [facility]: Phone Number:   [place]     [phone]   [place]    [phone]   [place]    [phone]   [place]    [phone]   [place]    [phone]   [place] [phone] "   [place]     [phone]      ? Call 911 or go to my nearest emergency department.             I helped develop this safety plan and agree to use it when needed.  I have been given a copy of this plan.          Today s date:  12/9/2020  Adapted from Safety Plan Template 2008 Anu Hopkins and Ibrahima Ross is reprinted with the express permission of the authors.  No portion of the Safety Plan Template may be reproduced without the express, written permission.  You can contact the authors at [email or web address] or [email or web address]

## 2020-12-10 NOTE — TELEPHONE ENCOUNTER
Admission Date: 12/11/2020    Identify any current concerns with potential impact to admission:     medication/medical concerns: not right now, started on new meds today     immediate safety concerns: no current safety concerns    Does patient have safety plan? yes Note: Please copy safety plan copied into BEH Encounter     Other (insurance/childcare/transportation/housing/planned absences/etc): will still see therapist every week at [phone] on thursdays.  Next wed he has a final at 10-12.  Also has a doctor appointment but unsure of date.    Patient's insurance is: [employer] .     Does patient need appointment with provider? Pt will see Dr Pendleton in program.  Has new psychiatrist Dr Sylvester Lugo has seen x1    Review patient's program schedule and inform them of any variation due to late days or holidays.                                                                                        Completed by: CINDI Tierney RN

## 2020-12-11 ENCOUNTER — HOSPITAL ENCOUNTER (OUTPATIENT)
Dept: BEHAVIORAL HEALTH | Facility: CLINIC | Age: 20
Discharge: HOME | End: 2020-12-11
Attending: PSYCHIATRY & NEUROLOGY | Admitting: PSYCHIATRY & NEUROLOGY
Payer: COMMERCIAL

## 2020-12-11 ENCOUNTER — HOSPITAL ENCOUNTER (OUTPATIENT)
Dept: BEHAVIORAL HEALTH | Facility: CLINIC | Age: 20
End: 2020-12-11
Attending: PSYCHIATRY & NEUROLOGY
Payer: COMMERCIAL

## 2020-12-11 PROBLEM — F33.1 MAJOR DEPRESSIVE DISORDER, RECURRENT EPISODE, MODERATE WITH ANXIOUS DISTRESS (H): Status: ACTIVE | Noted: 2020-12-11

## 2020-12-11 PROCEDURE — H0035 MH PARTIAL HOSP TX UNDER 24H: HCPCS | Mod: 95 | Performed by: COUNSELOR

## 2020-12-11 PROCEDURE — H0035 MH PARTIAL HOSP TX UNDER 24H: HCPCS | Mod: 95

## 2020-12-11 ASSESSMENT — ANXIETY QUESTIONNAIRES: GAD7 TOTAL SCORE: 20

## 2020-12-11 NOTE — GROUP NOTE
Psychoeducation Group Note    PATIENT'S NAME: Glen Cardenas  MRN:   [id number]  :   2000  ACCT. NUMBER: [id number]  DATE OF SERVICE: 20  START TIME: 11:00 AM  END TIME: 11:50 AM  FACILITATOR: Anuj Womack OT  TOPIC: [facility] OT Group: Lifestyle Balance and Structure  [facility] Adult Partial Hospitalization Program  TRACK: 1    NUMBER OF PARTICIPANTS: 6    Summary of Group / Topics Discussed:  Lifestyle Balance and Structure:  Weekly reflection and weekend wellness planning: To support progress towards treatment goals and psychiatric recovery, group members were led through a weekly structured process to reflect on progress, integrate new learning/skills, and emerging self-awareness into their daily and weekly life. Provided psychoeducation and guided reflection on the neuroscience of change, self-compassion through a reflection of what went well for them this week. Worked to identify ways they will embrace wellness this weekend and identifying potential challenges/barriers and problem solved with group members ways to over come them and embrace self compassion. \    Patient Session Goals / Objectives:    Reflected on what went well for them this week.    Identified ways they will support wellbeing over the weekend reflecting on structure/routine/balance all through a self compassionate filter.    Identified and problem solved barriers to wellbeing over the weekend.     Identified plan to support follow through on goals and reflection on progress made        Patient Participation / Response:  Fully participated with the group by sharing personal reflections / insights and openly received / provided feedback with other participants.    Verbalized understanding of content and Patient would benefit from additional opportunities to practice the content to be able to generalize it to their everyday life with increased intentionality, consistency, and efficacy in support of their psychiatric  recovery    Treatment Plan:  Patient has an initial individualized treatment plan that was created as part of their diagnostic assessment / admission process.  A master individualized treatment plan is in the process of being developed with the patient and multi-disciplinary care team.    Anuj Womack OT

## 2020-12-11 NOTE — GROUP NOTE
Psychoeducation Group Note    PATIENT'S NAME: Glen Cardenas  MRN:   [id number]  :   2000  ACCT. NUMBER: [id number]  DATE OF SERVICE: 20  START TIME: 10:00 AM  END TIME: 10:50 AM  FACILITATOR: Anuj Womack OT  TOPIC: [facility] OT Group: Self- Regulation Skills  [facility] Adult Partial Hospitalization Program  TRACK: 1    Telemedicine Visit: The patient's condition can be safely assessed and treated via synchronous audio and visual telemedicine encounter.      Reason for Telemedicine Visit: Services only offered telehealth and Covid 19    Originating Site (Patient Location): Patient's home    Distant Site (Provider Location): Provider Remote Setting    Consent:  The patient/guardian has verbally consented to: the potential risks and benefits of telemedicine (video visit) versus in person care; bill my insurance or make self-payment for services provided; and responsibility for payment of non-covered services.     Mode of Communication:  Video Conference via [employer]    As the provider I attest to compliance with applicable laws and regulations related to telemedicine.      NUMBER OF PARTICIPANTS: 5    Summary of Group / Topics Discussed:  Self-Regulation Skills: Sensory Enhanced Mindfulness: Patients were provided with written and verbal psychoeducation on the concept of integrating mindfulness with a bottom up, sensory rich, experiential intervention activities to develop self-awareness and skills for self-regulation.  Emphasis placed on the benefits of mindfulness through bottom up (body based) activities and how they can help to emotionally ground oneself or provide a healthy distraction to self-regulate when distressed. Patients worked to increase knowledge and skills during a guided skilled structured sensory enhanced intervention activity: focused cognitive group activity and self care activity to build resiliency self-efficacy. Facilitation of reflection to reinforce taught  concepts was provided.     Patient Session Goals / Objectives:    Identified how using sensory enhanced mindfulness practices can be used for grounding, stress management, and self-regulation      Improved awareness of different types of sensory enhanced mindfulness activities that assist with healthy coping of stress and symptoms      Established a plan for practice of these skills in their own environments    Practiced and reflected on how to generalize taught skills to their everyday life        Patient Participation / Response:  Fully participated with the group by sharing personal reflections / insights and openly received / provided feedback with other participants.    Verbalized understanding of content and Patient would benefit from additional opportunities to practice the content to be able to generalize it to their everyday life with increased intentionality, consistency, and efficacy in support of their psychiatric recovery    Treatment Plan:  Patient has an initial individualized treatment plan that was created as part of their diagnostic assessment / admission process.  A master individualized treatment plan is in the process of being developed with the patient and multi-disciplinary care team.    Anuj Womack, OT

## 2020-12-11 NOTE — GROUP NOTE
Psychotherapy Group Note    PATIENT'S NAME: Glen Cardenas  MRN:   [id number]  :   2000  ACCT. NUMBER: [id number]  DATE OF SERVICE: 20  START TIME:  1:00 PM  END TIME:  1:50 PM  FACILITATOR: Carolina Chen LPCC  TOPIC: MH EBP Group: Coping Skills  [facility] Adult Partial Hospitalization Program  TRACK: [facility]    NUMBER OF PARTICIPANTS: 6    Summary of Group / Topics Discussed:  Coping Skills: Improve the Moment: Patients learned to tolerate distress, by applying strategies to effect positive change in the present moment.  Patients will identified situations where they would benefit from applying strategies to improve the moment and reduce distress. Patients discussed how to distinguish when this can be useful in their lives or when other strategies would be more relevant or helpful.    Patient Session Goals / Objectives:    Discuss how the use of intentional  in the moment  actions can help reduce distress.    Review patients current practices and discuss a more formal way of practicing and accessing skills.    Increase ability to decide when to use improve the moment strategies    Choose 1-2 in the moment actions to apply during times of distress.      Telemedicine Visit: The patient's condition can be safely assessed and treated via synchronous audio and visual telemedicine encounter.      Reason for Telemedicine Visit: Services only offered telehealth and due to COVID-19    Originating Site (Patient Location): Patient's home    Distant Site (Provider Location): Provider Remote Setting    Consent:  The patient/guardian has verbally consented to: the potential risks and benefits of telemedicine (video visit) versus in person care; bill my insurance or make self-payment for services provided; and responsibility for payment of non-covered services.     Mode of Communication:  Video Conference via [employer]    As the provider I attest to compliance with applicable laws and regulations  related to telemedicine.        Patient Participation / Response:  Fully participated with the group by sharing personal reflections / insights and openly received / provided feedback with other participants.    Demonstrated understanding of topics discussed through group discussion and participation and Expressed understanding of the relevance / importance of coping skills at distressing times in life    Treatment Plan:  Patient has an initial individualized treatment plan that was created as part of their diagnostic assessment / admission process.  A master individualized treatment plan is in the process of being developed with the patient and multi-disciplinary care team.    Carolina Cehn, NICHOLASC

## 2020-12-11 NOTE — GROUP NOTE
"Process Group Note    PATIENT'S NAME: Glen Cardenas  MRN:   [id number]  :   2000  ACCT. NUMBER: [id number]  DATE OF SERVICE: 20  START TIME:  9:00 AM  END TIME:  9:50 AM  FACILITATOR: Carolina Chen LPCC  TOPIC:  Process Group    Diagnoses:  296.32 (F33.1) Major Depressive Disorder, Recurrent Episode, Moderate _ and With anxious distress  300.02 (F41.1) Generalized Anxiety Disorder.  4. Other Diagnoses that is relevant to services:   Hx of ADHD.      [facility] Adult Partial Hospitalization Program  TRACK: [facility]    NUMBER OF PARTICIPANTS: 4    Telemedicine Visit: The patient's condition can be safely assessed and treated via synchronous audio and visual telemedicine encounter.      Reason for Telemedicine Visit: Services only offered telehealth and due to COVID-19    Originating Site (Patient Location): Patient's home    Distant Site (Provider Location): Provider Remote Setting    Consent:  The patient/guardian has verbally consented to: the potential risks and benefits of telemedicine (video visit) versus in person care; bill my insurance or make self-payment for services provided; and responsibility for payment of non-covered services.     Mode of Communication:  Video Conference via [employer]    As the provider I attest to compliance with applicable laws and regulations related to telemedicine.            Data:    Session content: At the start of this group, patients were invited to check in by identifying themselves, describing their current emotional status, and identifying issues to address in this group.   Area(s) of treatment focus addressed in this session included Symptom Management, Personal Safety and Community Resources/Discharge Planning.    Patient reported feeling \"ok and anxious\" today. Patient discussed working toward motivating himself to complete homework. Patient identified relaxation as skills they will use to address their goal(s). Patient reported being " dsitracted may be a barrier to working toward their goal(s) and/or addressing mental health symptoms. Patient reported no safety concerns and/or self-injurious behaviors. Patient reported yes substance use. Patient reported they are taking their medications as prescribed. Patient reported feeling proud that they got up and showered before programming. Patient discussed feeling optimistic about medications with the treatment group.     Therapeutic Interventions/Treatment Strategies:  Psychotherapist offered support, feedback and validation and reinforced use of skills. Treatment modalities used include Motivational Interviewing and Cognitive Behavioral Therapy. Interventions include Coping Skills: Assisted patient in identifying 1-2 healthy distraction skills to reduce overall distress, Symptoms Management: Promoted understanding of their diagnoses and how it impacts their functioning and Emotions Management:  Discussed barriers to emotional regulation.    Assessment:    Patient response:   Patient responded to session by accepting feedback, giving feedback, listening, focusing on goals, being attentive and accepting support    Possible barriers to participation / learning include: and no barriers identified    Health Issues:   None reported       Substance Use Review:   Substance Use: Last use: Cannabis    Mental Status/Behavioral Observations  Appearance:   Appropriate   Eye Contact:   Good   Psychomotor Behavior: Normal   Attitude:   Cooperative   Orientation:   All  Speech   Rate / Production: Normal/ Responsive Normal    Volume:  Normal   Mood:    Anxious  Depressed  Normal  Affect:    Appropriate   Thought Content:   Clear and Safety denies any current safety concerns including suicidal ideation, self-harm, and homicidal ideation  Thought Form:  Coherent  Logical     Insight:    Good     Plan:     Safety Plan: No current safety concerns identified.  Recommended that patient call 911 or go to the local ED should  there be a change in any of these risk factors.     Barriers to treatment: None identified    Patient Contracts (see media tab):  None    Substance Use: Provided encouragement towards sobriety     Continue or Discharge: Patient will continue in Adult Partial Hospitalization Program (PHP)  as planned. Patient is likely to benefit from learning and using skills as they work toward the goals identified in their treatment plan.      Carolina Chen, [facility]  December 11, 2020
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
## 2020-12-14 ENCOUNTER — TELEPHONE (OUTPATIENT)
Dept: BEHAVIORAL HEALTH | Facility: CLINIC | Age: 20
End: 2020-12-14

## 2020-12-14 ENCOUNTER — OFFICE VISIT - HEALTHEAST (OUTPATIENT)
Dept: INTERNAL MEDICINE | Facility: CLINIC | Age: 20
End: 2020-12-14

## 2020-12-14 ENCOUNTER — HOSPITAL ENCOUNTER (OUTPATIENT)
Dept: BEHAVIORAL HEALTH | Facility: CLINIC | Age: 20
End: 2020-12-14
Attending: PSYCHIATRY & NEUROLOGY
Payer: COMMERCIAL

## 2020-12-14 ENCOUNTER — HOSPITAL ENCOUNTER (OUTPATIENT)
Dept: BEHAVIORAL HEALTH | Facility: CLINIC | Age: 20
Discharge: HOME | End: 2020-12-14
Attending: PSYCHIATRY & NEUROLOGY | Admitting: PSYCHIATRY & NEUROLOGY
Payer: COMMERCIAL

## 2020-12-14 DIAGNOSIS — F41.8 DEPRESSION WITH ANXIETY: ICD-10-CM

## 2020-12-14 PROCEDURE — H0035 MH PARTIAL HOSP TX UNDER 24H: HCPCS | Mod: 95

## 2020-12-14 PROCEDURE — H0035 MH PARTIAL HOSP TX UNDER 24H: HCPCS | Mod: 95 | Performed by: COUNSELOR

## 2020-12-14 NOTE — GROUP NOTE
Psychoeducation Group Note    PATIENT'S NAME: Glen Cardenas  MRN:   [id number]  :   2000  ACCT. NUMBER: [id number]  DATE OF SERVICE: 20  START TIME: 11:00 AM  END TIME: 11:50 AM  FACILITATOR: Anuj Womack OT  TOPIC: [facility] OT Group: Partial Hospitalization Program- Occupational Therapy  [facility] Adult Partial Hospitalization Program  TRACK: 1    Telemedicine Visit: The patient's condition can be safely assessed and treated via synchronous audio and visual telemedicine encounter.      Reason for Telemedicine Visit: Services only offered telehealth and Covid 19    Originating Site (Patient Location): Patient's home    Distant Site (Provider Location): Provider Remote Setting    Consent:  The patient/guardian has verbally consented to: the potential risks and benefits of telemedicine (video visit) versus in person care; bill my insurance or make self-payment for services provided; and responsibility for payment of non-covered services.     Mode of Communication:  Video Conference via [employer]    As the provider I attest to compliance with applicable laws and regulations related to telemedicine.      NUMBER OF PARTICIPANTS: 7      Summary of Group / Topics Discussed: Resiliency Development: Motivation and Procrastination  Focus on the group is to support patients in identifying barriers to task completion, challenge negative self talk, and how their mental health symptoms impact this.  Provided psychoeducation and helped patients identify skills they may employ to assist with task follow through.  Facilitated supportive discussion providing validation and support.     Patient Session Goals / Objectives:    Patient will be able to better identify barriers to task completion and how their mental health symptoms impact this    Patients will identify 1-2 ways they can challenge these barriers to help with task completion            Patient Participation / Response:  Fully participated  with the group by sharing personal reflections / insights and openly received / provided feedback with other participants.    Verbalized understanding of content and Patient would benefit from additional opportunities to practice the content to be able to generalize it to their everyday life with increased intentionality, consistency, and efficacy in support of their psychiatric recovery    Treatment Plan:  Patient has an initial individualized treatment plan that was created as part of their diagnostic assessment / admission process.  A master individualized treatment plan is in the process of being developed with the patient and multi-disciplinary care team.    Anuj Womack, OT

## 2020-12-14 NOTE — TELEPHONE ENCOUNTER
Writer contacted patient regarding his absence from group, patient reported he has a doctor appointment this morning and plans to join afternoon programming.     NELSON Love on 12/14/2020 at 9:39 AM

## 2020-12-14 NOTE — GROUP NOTE
Psychoeducation Group Note    PATIENT'S NAME: Glen Cardenas  MRN:   [id number]  :   2000  ACCT. NUMBER: [id number]  DATE OF SERVICE: 20  START TIME:  1:00 PM  END TIME:  1:50 PM  FACILITATOR: Anuj Womack OT  TOPIC: [facility] OT Group: Lifestyle Balance and Structure  [facility] Adult Partial Hospitalization Program  TRACK:1    Telemedicine Visit: The patient's condition can be safely assessed and treated via synchronous audio and visual telemedicine encounter.      Reason for Telemedicine Visit: Services only offered telehealth and Covid 19    Originating Site (Patient Location): Patient's home    Distant Site (Provider Location): Provider Remote Setting    Consent:  The patient/guardian has verbally consented to: the potential risks and benefits of telemedicine (video visit) versus in person care; bill my insurance or make self-payment for services provided; and responsibility for payment of non-covered services.     Mode of Communication:  Video Conference via [employer]    As the provider I attest to compliance with applicable laws and regulations related to telemedicine.      NUMBER OF PARTICIPANTS: 6    Summary of Group / Topics Discussed:  Lifestyle Balance and Structure:  OT Goal-setting & integration: To support progress towards treatment goals and psychiatric recovery, group members were led through a weekly structured process to reflect on progress, integrate new learning/skills, and emerging self-awareness into their daily and weekly life. Provided psychoeducation on the neuroscience of change, self-compassion, and the anatomy of a SMART goal to the group. Facilitated the sharing of individual goals with validation, support, and feedback provided.    Patient Session Goals / Objectives:    Reflected on and create a vision for recovery and wellbeing    Identified and wrote 3 SMART goals for the week    Identified and problem solved barriers to achieving identified goals      Identified plan to support follow through on goals and reflection on progress made        Patient Participation / Response:  Fully participated with the group by sharing personal reflections / insights and openly received / provided feedback with other participants.    Verbalized understanding of content and Patient would benefit from additional opportunities to practice the content to be able to generalize it to their everyday life with increased intentionality, consistency, and efficacy in support of their psychiatric recovery    Treatment Plan:  Patient has an initial individualized treatment plan that was created as part of their diagnostic assessment / admission process.  A master individualized treatment plan is in the process of being developed with the patient and multi-disciplinary care team.    Anuj Womack, OT

## 2020-12-14 NOTE — PROGRESS NOTES
General acute hospital   Dr. Pendleton's Psychiatric Progress Note  2020      Patient:  Glen Cardenas   Medical Record Number:  [id number]  :  2000    Telemedicine Visit: The patient's condition can be safely assessed and treated via synchronous audio and visual telemedicine encounter.       Reason for Telemedicine Visit: COVID-19 lockdown     Originating Site (Patient Location):  HOME     Distant Site (Provider Location): [facility]: [place]     Consent:  The patient/guardian has verbally consented to: the potential risks and benefits of telemedicine (video visit) versus in person care; bill my insurance or make self-payment for services provided; and responsibility for payment of non-covered services          Interim History:   The patient's care was discussed with the treatment team and chart notes were reviewed.  Group was good today.  He started a little late. Saw Edwin Lanier today, his PCP.   Weekend was good.  Did a lot of homework.  Relaxed.  Takes the Abilify once daily.  Falls asleep before he takes it.      Psychiatric ROS:  Mood:  a little up and down, better last few days, not anxious, some low moods, mostly neutral  Sleep:  Wakes up in the middle of the night and has a hard time falling back to sleep;  train noise keeps him up;   Appetite:normal  Eating:normal  Energy Level:  A little lower than usual  Concentration/Memory: difficult to do homework;  Easier to concentrate in group;    Suicidal Thoughts:No  Homicidal Thoughts:No  Psychotic Symptoms: No  Medication Side Effects:No  Medication Compliance:Yes   Physical Complaints:negative         Medications:     PAST PSYCH MEDS:  Unknown stimulants, Celexa, Lexapro, Abilify    Current Outpatient Medications   Medication Sig     ARIPiprazole (ABILIFY) 2 MG tablet Take 2 mg by mouth daily     calcium carbonate (TUMS) 500 MG chewable tablet Take 1 chew tab by mouth daily     LEXAPRO 20MG Take 20mg  daily     ranitidine (ZANTAC) 150 MG tablet Take 1 tablet (150 mg) by mouth 2 times daily (Patient not taking: Reported on 12/6/2020)     No current facility-administered medications for this encounter.              Allergies:   No Known Allergies         Psychiatric Examination:   There were no vitals taken for this visit.  Weight is 0 lbs 0 oz  There is no height or weight on file to calculate BMI.    Appearance:  awake, alert and adequately groomed  Attitude:  cooperative  Eye Contact:  good  Mood:    a little up and down  Affect:  mood congruent  Speech:  clear, coherent  Psychomotor Behavior:  no evidence of tardive dyskinesia, dystonia, or tics  Throught Process:  logical  Associations:  no loose associations  Thought Content:  no evidence of suicidal ideation or homicidal ideation and no evidence of psychotic thought  Insight:  good  Judgement:  intact  Oriented to:  time, person, and place  Attention Span and Concentration:  intact  Recent and Remote Memory:  intact  Gait:Normal    Risk/Potential for Dangerousness:  Multiple Active Diagnoses:HIGH  Self Care:HIGH  Suicide:LOW  Assault:LOW  Self Injurious Behaviors:LOW  Inappropriate Sexual Behavior:LOW         Labs:   No results found for this or any previous visit (from the past 24 hour(s)).     No results found for this or any previous visit (from the past 1008 hour(s)).      Impression:   This is a 20 year old male continues PHP for mood stabilization.  Mood is improving.          DIagnoses:     Axis I:  Major depressive disorder, recurrent.  Generalized anxiety disorder.  Cannabis use disorder.   Axis II:  No diagnosis.   Axis III:  History of concussion 09/2020.          Plan:     Continue [facility] Program.   Continue psychotropic medications.  He was just switched from Celexa to Lexapro.   Switch Abilify from HS to AM.  Would consider augmentation with Wellbutrin or switching to an SNRI if the  Lexapro is not helpful.     Aj Pendleton MD

## 2020-12-14 NOTE — GROUP NOTE
Psychotherapy Group Note    PATIENT'S NAME: Glen Cardenas  MRN:   [id number]  :   2000  ACCT. NUMBER: [id number]  DATE OF SERVICE: 20  START TIME:  2:00 PM  END TIME:  2:50 PM  FACILITATOR: Carolina Chen LPCC  TOPIC: MH EBP Group: Self-Awareness  [facility] Adult Partial Hospitalization Program  TRACK: [facility]    NUMBER OF PARTICIPANTS: 6    Summary of Group / Topics Discussed:  Self-Awareness: Values: Patients identified personal values by examining development of their current values and how their values influence their daily functioning and life choices. Patients explored the impact of their values on their thoughts, feelings, and actions. Patients discussed definition of personal values and how they develop and change over time. The goal is to help patients reconcile value conflicts and achieve balance and flexibility to improve mood and daily functioning.     Patient Session Goals / Objectives:    Examined development of values and impact of values on functioning    Identified and prioritized important values related to current well-being     Identified strategies to change or enhance values to positively impact symptoms    Assisted patients to find ways to adapt functioning to better fit their values    Telemedicine Visit: The patient's condition can be safely assessed and treated via synchronous audio and visual telemedicine encounter.      Reason for Telemedicine Visit: Services only offered telehealth and due to COVID-19    Originating Site (Patient Location): Patient's home    Distant Site (Provider Location): Provider Remote Setting    Consent:  The patient/guardian has verbally consented to: the potential risks and benefits of telemedicine (video visit) versus in person care; bill my insurance or make self-payment for services provided; and responsibility for payment of non-covered services.     Mode of Communication:  Video Conference via vzaar    As the provider I  attest to compliance with applicable laws and regulations related to telemedicine.        Patient Participation / Response:  Fully participated with the group by sharing personal reflections / insights and openly received / provided feedback with other participants.    Demonstrated understanding of topics discussed through group discussion and participation, Demonstrated understanding of values, strengths, and challenges to learn about themselves and Identified / Expressed readiness to act intentionally, increase self-compassion, promote personal growth    Treatment Plan:  Patient has an initial individualized treatment plan that was created as part of their diagnostic assessment / admission process.  A master individualized treatment plan is in the process of being developed with the patient and multi-disciplinary care team.    Carolina Chen, [facility]

## 2020-12-15 ENCOUNTER — HOSPITAL ENCOUNTER (OUTPATIENT)
Dept: BEHAVIORAL HEALTH | Facility: CLINIC | Age: 20
End: 2020-12-15
Attending: PSYCHIATRY & NEUROLOGY
Payer: COMMERCIAL

## 2020-12-15 PROCEDURE — H0035 MH PARTIAL HOSP TX UNDER 24H: HCPCS | Mod: 95

## 2020-12-15 PROCEDURE — H0035 MH PARTIAL HOSP TX UNDER 24H: HCPCS | Mod: 95 | Performed by: OCCUPATIONAL THERAPIST

## 2020-12-15 PROCEDURE — H0035 MH PARTIAL HOSP TX UNDER 24H: HCPCS | Mod: 95 | Performed by: COUNSELOR

## 2020-12-15 NOTE — GROUP NOTE
Psychoeducation Group Note    PATIENT'S NAME: Glen Cardenas  MRN:   [id number]  :   2000  ACCT. NUMBER: [id number]  DATE OF SERVICE: 12/15/20  START TIME:  9:00 AM  END TIME:  9:50 AM  FACILITATOR: Ana Walker OTR/L  TOPIC:  PHP OT Group: Partial Hospitalization Program- Occupational Therapy  [facility] Adult Partial Hospitalization Program  TRACK: 1    NUMBER OF PARTICIPANTS: 6    Telemedicine Visit: The patient's condition can be safely assessed and treated via synchronous audio and visual telemedicine encounter.      Reason for Telemedicine Visit: Services only offered telehealth    Originating Site (Patient Location): Patient's home    Distant Site (Provider Location): [facility] Outpatient Setting: [facility]    Consent:  The patient/guardian has verbally consented to: the potential risks and benefits of telemedicine (video visit) versus in person care; bill my insurance or make self-payment for services provided; and responsibility for payment of non-covered services.     Mode of Communication:  Video Conference via Zoom    As the provider I attest to compliance with applicable laws and regulations related to telemedicine.     Summary of Group / Topics Discussed:  Transitions:  Facilitated a discussion around the concept of recognizing, honoring, and coping with transitions in their lives and the impact their mental health symptoms may have on this.  Provided psychoeducation, validation and support on transitions and coping with change.  Assisted patients in beginning to look at resources and needs once they have completed the Partial Program for ongoing care and support.  Facilitated discussion and patients worked on creating personalized aftercare coping cards related to warning signs, coping skills, important phone numbers, and positive thinking/gratitude.             Patient Session Goals / Objectives:    To identify current transitions in their lives that  are currently experiencing    To assist with a smooth transition to the next level of care with needed resources and support    To assist with the application of skills taught in the program to everyday life, planning and problem solving as needed          Patient Participation / Response:  Fully participated with the group by sharing personal reflections / insights and openly received / provided feedback with other participants.    Patient presentation: constricted affect; active in group discussion sharing insights/examples, Demonstrated understanding of content through identifying early warning signs for himself and some helpful coping strategies he uses  and Patient would benefit from additional opportunities to practice the content to be able to generalize it to their everyday life with increased intentionality, consistency, and efficacy in support of their psychiatric recovery    Treatment Plan:  Patient has an initial individualized treatment plan that was created as part of their diagnostic assessment / admission process.  A master individualized treatment plan is in the process of being developed with the patient and multi-disciplinary care team.    Ana Walker OTR/L

## 2020-12-15 NOTE — GROUP NOTE
Psychoeducation Group Note    PATIENT'S NAME: Glen Cardenas  MRN:   [id number]  :   2000  ACCT. NUMBER: [id number]  DATE OF SERVICE: 12/15/20  START TIME: 10:00 AM  END TIME: 10:50 AM  FACILITATOR: Hallie Tierney RN  TOPIC: MH RN Group: Mental Health Maintenance  [facility] Partial Hospitalization Program  TRACK: 1    NUMBER OF PARTICIPANTS: 5    Summary of Group / Topics Discussed:  Mental Health Maintenance:  Self -Compassion- Patients watched a brief video by Dr. Brittney Moran on self-compassion. The group discussed the differences between self- esteem and self-compassion.  A brief review of the research highlighting mental health benefits of practicing self compassion were discussed.     Patient Session Goals/Objectives:  ? Pts will understand 3 core concepts of practicing self-compassion  ? Pts will gain an understanding of neurochemicals Oxytocin, Endorphins, and Cortisol  ? Pts will generate a short list and have the opportunity to practice expressing self-compassion statements          Patient Participation / Response:  Fully participated with the group by sharing personal reflections / insights and openly received / provided feedback with other participants.    Demonstrated understanding of topics discussed through group discussion and participation    Treatment Plan:  Patient has a current master individualized treatment plan.  See Epic treatment plan for more information.    Hallie Tierney RN

## 2020-12-15 NOTE — GROUP NOTE
"Process Group Note    PATIENT'S NAME: Glen Cardenas  MRN:   [id number]  :   2000  ACCT. NUMBER: [id number]  DATE OF SERVICE: 12/15/20  START TIME:  1:00 PM  END TIME:  1:50 PM  FACILITATOR: Carolina Chen LPCC  TOPIC:  Process Group    Diagnoses:  296.32 (F33.1) Major Depressive Disorder, Recurrent Episode, Moderate _ and With anxious distress  300.02 (F41.1) Generalized Anxiety Disorder.  4. Other Diagnoses that is relevant to services:   Hx of ADHD.      [facility] Adult Partial Hospitalization Program  TRACK: [facility]    NUMBER OF PARTICIPANTS: 6    Telemedicine Visit: The patient's condition can be safely assessed and treated via synchronous audio and visual telemedicine encounter.      Reason for Telemedicine Visit: Services only offered telehealth and due to COVID-19    Originating Site (Patient Location): Patient's home    Distant Site (Provider Location): Provider Remote Setting    Consent:  The patient/guardian has verbally consented to: the potential risks and benefits of telemedicine (video visit) versus in person care; bill my insurance or make self-payment for services provided; and responsibility for payment of non-covered services.     Mode of Communication:  Video Conference via Eved    As the provider I attest to compliance with applicable laws and regulations related to telemedicine.            Data:    Session content: At the start of this group, patients were invited to check in by identifying themselves, describing their current emotional status, and identifying issues to address in this group.   Area(s) of treatment focus addressed in this session included Symptom Management, Personal Safety and Community Resources/Discharge Planning.    Patient reported feeling \"medium and melancholy.\" Patient discussed working toward finishing school strong. Patient identified breakin up assignments into smaller parts as skills they will use to address their goal(s). Patient " reported complacency may be a barrier to working toward their goal(s) and/or addressing mental health symptoms. Patient reported no safety concerns and/or self-injurious behaviors. Patient reported no substance use. Patient reported they are taking their medications as prescribed. Patient reported feeling proud that they finished an essay yesterday. Patient discussed finishing his semester next week with the treatment group.     Therapeutic Interventions/Treatment Strategies:  Psychotherapist offered support, feedback and validation and reinforced use of skills. Treatment modalities used include Motivational Interviewing and Cognitive Behavioral Therapy. Interventions include Coping Skills: Assisted patient in identifying 1-2 healthy distraction skills to reduce overall distress, Symptoms Management: Promoted understanding of their diagnoses and how it impacts their functioning and Emotions Management:  Discussed barriers to emotional regulation.    Assessment:    Patient response:   Patient responded to session by accepting feedback, giving feedback, listening, focusing on goals and being attentive    Possible barriers to participation / learning include: and no barriers identified    Health Issues:   None reported       Substance Use Review:   Substance Use: No active concerns identified.    Mental Status/Behavioral Observations  Appearance:   Appropriate   Eye Contact:   Good   Psychomotor Behavior: Normal   Attitude:   Cooperative   Orientation:   All  Speech   Rate / Production: Normal/ Responsive Normal    Volume:  Normal   Mood:    Anxious  Depressed  Normal  Affect:    Appropriate   Thought Content:   Clear and Safety denies any current safety concerns including suicidal ideation, self-harm, and homicidal ideation  Thought Form:  Coherent  Logical     Insight:    Good     Plan:     Safety Plan: No current safety concerns identified.  Recommended that patient call 911 or go to the local ED should there be a  change in any of these risk factors.     Barriers to treatment: None identified    Patient Contracts (see media tab):  None    Substance Use: Provided encouragement towards sobriety     Continue or Discharge: Patient will continue in Adult Partial Hospitalization Program (PHP)  as planned. Patient is likely to benefit from learning and using skills as they work toward the goals identified in their treatment plan.      Carolina Chen, [facility]  December 15, 2020

## 2020-12-15 NOTE — GROUP NOTE
Psychoeducation Group Note    PATIENT'S NAME: Glen Cardenas  MRN:   [id number]  :   2000  ACCT. NUMBER: [id number]  DATE OF SERVICE: 12/15/20  START TIME: 11:00 AM  END TIME: 11:50 AM  FACILITATOR: Anuj Womack OT  TOPIC: [facility] OT Group: Self- Regulation Skills  [facility] Adult Partial Hospitalization Program  TRACK: 1    Telemedicine Visit: The patient's condition can be safely assessed and treated via synchronous audio and visual telemedicine encounter.      Reason for Telemedicine Visit: Services only offered telehealth and Covid 19    Originating Site (Patient Location): Patient's home    Distant Site (Provider Location): Provider Remote Setting    Consent:  The patient/guardian has verbally consented to: the potential risks and benefits of telemedicine (video visit) versus in person care; bill my insurance or make self-payment for services provided; and responsibility for payment of non-covered services.     Mode of Communication:  Video Conference via RocketBolt    As the provider I attest to compliance with applicable laws and regulations related to telemedicine.      NUMBER OF PARTICIPANTS: 6    Summary of Group / Topics Discussed:  Occupational Therapy: Self-Regulation Skills: The vagus nerve and autonomic regulation:   Patient's explored the neurosensory connections between the body and the mind including skills and techniques to help develop one's capacity to be self aware and self regulate. Patients were provided with psychoeducation on the Autonomic Nervous System and explored the ways to tap into the calming properties of the Vagus nerve (parasympathetic NS) to help with distress tolerance and manage the impact of stressors on the body. Concepts presented and discussed included: applied polyvagal theory (neuroception, ventral and dorsal vagal activity, sympathetic, vagal brake). Explored ways to develop vagal tone and heart rate variability as strategy to support effective  stress responses in specific contexts for improved functioning. Facilitated discussion around specific ways to they are doing this already and some possibilities for moving forward including breath work, cold input, meditation, physical activity, socializing & laughing, talking/singing/humming/gargling as based on current neurosensory science.    Patient Session Goals / Objectives:    [place] how the ANS works with an emphasis on the vagus nerve and importance of its' impact on functioning and mental wellbeing.    [place] about the Polyvagal theory as a framework to begin to understand stress responses and autonomic regulation. (Abby)    [place] how developing interoceptive awareness can help one self-regulate sooner rather than later.    Identified specific and individualized neurosensory skills to help when distressed and for crisis management via the vagus nerve, specifically ways to develop/build vagal tone to support mental health management.     Established a plan for practice of these skills in their own environments aligned with their Occupational Therapy goals.         Patient Participation / Response:  Fully participated with the group by sharing personal reflections / insights and openly received / provided feedback with other participants.    Verbalized understanding of content and Patient would benefit from additional opportunities to practice the content to be able to generalize it to their everyday life with increased intentionality, consistency, and efficacy in support of their psychiatric recovery    Treatment Plan:  Patient has an initial individualized treatment plan that was created as part of their diagnostic assessment / admission process.  A master individualized treatment plan is in the process of being developed with the patient and multi-disciplinary care team.    Anuj Womack, OT

## 2020-12-15 NOTE — GROUP NOTE
Psychotherapy Group Note    PATIENT'S NAME: Glen Cardenas  MRN:   [id number]  :   2000  ACCT. NUMBER: [id number]  DATE OF SERVICE: 12/15/20  START TIME:  2:00 PM  END TIME:  2:50 PM  FACILITATOR: Carolina Chen LPCC  TOPIC:  EBP Group: Emotions Management  [facility] Adult Partial Hospitalization Program  TRACK: [facility]    NUMBER OF PARTICIPANTS: 6    Summary of Group / Topics Discussed:  Emotions Management: Guilt and Shame: Patients explored and shared personal experiences associated with feelings of guilt and shame.  Group explored how these feelings develop, what they mean to each individual, and how to increase acceptance and usefulness of these feelings.  Group members assisted one another to contextualize these concepts and promote healing.     Patient Session Goals / Objectives:    Discuss and review definitions and personal views/experiences with guilt and shame    Understand the differences between guilt and shame    Explore how feelings of guilt and shame impact functioning    Understand and practice strategies to manage difficult emotions and move towards healing    Understand and normalize difficult emotions through group discussion    Understand the utility of guilt and shame    Target  unwanted  emotions for change    Telemedicine Visit: The patient's condition can be safely assessed and treated via synchronous audio and visual telemedicine encounter.      Reason for Telemedicine Visit: Services only offered telehealth and due to COVID-19    Originating Site (Patient Location): Patient's home    Distant Site (Provider Location): Provider Remote Setting    Consent:  The patient/guardian has verbally consented to: the potential risks and benefits of telemedicine (video visit) versus in person care; bill my insurance or make self-payment for services provided; and responsibility for payment of non-covered services.     Mode of Communication:  Video Conference via [place]    As  the provider I attest to compliance with applicable laws and regulations related to telemedicine.        Patient Participation / Response:  Fully participated with the group by sharing personal reflections / insights and openly received / provided feedback with other participants.    Demonstrated understanding of topics discussed through group discussion and participation, Expressed understanding of the relevance / importance of emotions management skills at distressing times in life and Self-aware of experiences with difficult emotions, and strategies to employ to manage them    Treatment Plan:  Patient has an initial individualized treatment plan that was created as part of their diagnostic assessment / admission process.  A master individualized treatment plan is in the process of being developed with the patient and multi-disciplinary care team.    Carolina Chen, [facility]

## 2020-12-16 ENCOUNTER — HOSPITAL ENCOUNTER (OUTPATIENT)
Dept: BEHAVIORAL HEALTH | Facility: CLINIC | Age: 20
Discharge: HOME | End: 2020-12-16
Attending: PSYCHIATRY & NEUROLOGY | Admitting: PSYCHIATRY & NEUROLOGY
Payer: COMMERCIAL

## 2020-12-16 ENCOUNTER — HOSPITAL ENCOUNTER (OUTPATIENT)
Dept: BEHAVIORAL HEALTH | Facility: CLINIC | Age: 20
End: 2020-12-16
Attending: PSYCHIATRY & NEUROLOGY
Payer: COMMERCIAL

## 2020-12-16 PROCEDURE — H0035 MH PARTIAL HOSP TX UNDER 24H: HCPCS | Mod: GT

## 2020-12-16 PROCEDURE — H0035 MH PARTIAL HOSP TX UNDER 24H: HCPCS | Mod: 95 | Performed by: SOCIAL WORKER

## 2020-12-16 NOTE — PROGRESS NOTES
Acknowledgement of Current Treatment Plan       I have reviewed my treatment plan with my therapist / counselor on 12/16/20.   I agree with the plan as it is written in the electronic health record.    Name:      Signature:  Glen Cardenas Unable to sign due to COVID-19     Dr. Aj Pendleton MD  Psychiatrist    Jen Meyer, MS, [facility], BC-DMT  Psychotherapist     Carolina Chen MA, [facility]  Psychotherapist  NELSON Love on 12/16/2020 at 12:13 PM

## 2020-12-16 NOTE — DISCHARGE SUMMARY
Adult Mental Health Intensive Outpatient Discharge Summary/Instructions      Patient: Glen Cardenas MRN: [id number]   : 2000 Age: 20 year old Sex: male     Admission Date: 20  Discharge Date: 20  Diagnosis: Axis I:  Major depressive disorder, recurrent.  Generalized anxiety disorder.  Cannabis use disorder.   Axis II:  No diagnosis.   Axis III:  History of concussion 2020.     Focus of Treatment / Progress    Personal Safety:      * Follow your safety plan     * Call crisis lines as needed:    [facility] [phone] [facility] [phone]  [facility] [phone] Crisis Connection [phone]  [facility] [phone] [facility] [phone]  [facility] [phone] National Suicide Prevention [phone]  [facility] [phone] Suicide Prevention [phone]  [facility] [phone]    Managing symptoms of:  Depression, anxiety, emotional outbursts     Community support/health:  [facility].[employer] [employer]    Managing Symptoms and Preventing Relapse    * Go to all of your appointments    * Take all medications as directed.      * Carry a current list if medication with you    * Do not use illicit (street) drugs.  Avoid alcohol    * Report these symptoms to your care team. These are early signs of relapse:   Thoughts of suicide   Losing more sleep   Increased confusion   Mood getting worse   Feeling more aggressive   Other:  Isolation    *Use these skills daily:  Mindfulness, grounding, TIPP, balanced sleep    Copy of summary sent to: Patient via mail    Follow up with psychiatrist / main caregiver: Chaitanya Lugo    Next visit: 21    Follow up with your therapist: Chip Ozuna   Next visit: 20    Go to group therapy and / or support groups at: Patient will contact program staff if able to enroll in adult day treatment program.     See your medical doctor about:  Annual physical exams and any medical conditions that may arise.     Other:   Your treatment team appreciated the opportunity to work with you Serafin!    Client Signature:_Unable to sign due to COVID-19  Staff Signature:_Carolina Chen [facility] on 12/24/2020 at 12:11 PM

## 2020-12-16 NOTE — PROGRESS NOTES
"         Outpatient Mental Health Services - Adult     MY COPING PLAN FOR SAFETY     PATIENT'S NAME:    Glen Cardenas  MRN:                           [id number]     SAFETY PLAN:     Step 1: Warning signs / cues (Thoughts, images, mood, situation, behavior) that a crisis may be developing:     ? Thoughts: \"No one would care if I was dead\"  ? Images: everything goes black like there is a tornado in my head  ? Thinking Processes: racing thoughts  ? Mood: intense anger and intense worry  ? Behaviors: argues and yells at others  ? Situations: changes in symptoms: increase in anxiety and relationship problems   ?    Step 2: Coping strategies - Things I can do to take my mind off of my problems without contacting another person (relaxation technique, physical activity):     ? Distress Tolerance Strategies:  nneds to idenfity  ? Physical Activities: hot shower, exercise  ? Focus on helpful thoughts:  needs to identify     Step 3: People and social settings that provide distraction:                 Name: friend Jignesh, grandpa, father              walk around campus, being outside        Step 4: Remind myself of people and things that are important to me and worth living for:  Mother, father, family     Step 5: When I am in crisis, I can ask these people to help me use my safety plan:                 Name: mother or father                Step 6: Making the environment safe:      ? be around others     Step 7: Professionals or agencies I can contact during a crisis:     ? Suicide Prevention Lifeline: 2-483-885-TALK (1160)  ? Crisis Text Line Service (available 24 hours a day, 7 days a week): Text [place] to 086241  ? Call  **CRISIS (102606) from a cell phone to talk to a team of professionals who can help you.          Crisis Services By [facility]: Phone Number:   [place]     [phone]   [place]    [phone]   [place]    [phone]   [place]    [phone]   [place]    [phone]   [place] [phone]   [place]     " [phone]      ? Call 911 or go to my nearest emergency department.             I helped develop this safety plan and agree to use it when needed.  I have been given a copy of this plan.          Today s date:  12/9/2020  Adapted from Safety Plan Template 2008 Anu Hopkins and Ibrahima Ross is reprinted with the express permission of the authors.  No portion of the Safety Plan Template may be reproduced without the express, written permission.  You can contact the authors at [email or web address] or [email or web address]                 Clinical Summary:  1. Reason for assessment: pt referred for outpatient programs by emergency department  .  2. Psychosocial, Cultural and Contextual Factors: Pt referred to [facility] after emergency department visit.    .  3. Principal DSM5 Diagnoses  (Sustained by DSM5 Criteria Listed Above):   296.32 (F33.1) Major Depressive Disorder, Recurrent Episode, Moderate _ and With anxious distress  300.02 (F41.1) Generalized Anxiety Disorder.  4. Other Diagnoses that is relevant to services:   Hx of ADHD.  5. Provisional Diagnosis:  Substance-Related & Addictive Disorders 304.30 (F12.20) Cannabis Use Disorder  as evidenced by legal difficulties and use since adolescence,    6. Prognosis: Expect Improvement.   7. Likely consequences of symptoms if not treated: Without treatment patient more than likely will experience a continuation of symptoms with decreased daily functioning, requiring an increased level of care..  8. Client strengths include:  empathetic and passionate, hard worker, loyal .      Recommendations:      1. Plan for Safety and Risk Management:              A safety and risk management plan has been developed including: Patient consented to co-developed safety plan.  Safety and risk management plan was completed.  Patient agreed to use safety plan should any safety concerns arise.  A copy was given to the patient..                                                                                                 Report to child / adult protection services was NA.      2. Patient identified no cultural or spiritual concerns for treatment services. Patient encouraged to ask for help should needs arise in the course of treatment.          3. Initial Treatment will focus on:               working on managing thinking to communicate effectively,  work on skills to control his own happiness,  work at putting himself first to manage his depression and anxiety.                4. Resources/Service Plan:               services are not indicated.              Modifications to assist communication are not indicated.              Additional disability accommodations are not indicated.                 5. Collaboration:              Collaboration / coordination of treatment will be initiated with the following             support professionals: outpatient therapist and psychiatry.      6.  Referrals:              The following referral(s) will be initiated: Partial Hospitalization Program. Next Scheduled Appointment: 12/11/20.                 A Release of Information has been obtained for the following: emergency contact; father.     7. GUZMAN:               GUZMAN:  Discussed the general effects of drugs and alcohol on health and well-being.  Recommendations:  Would be important to keep use in mind in treatment planning, engaging skills to manage stress and improve sleep as those seem to be main triggers for use for patient .   .      8. Records:              These were reviewed at time of assessment.              Information in this assessment was obtained from the medical record and  provided by patient who is a good historian.    Patient will have open access to their mental health medical record.

## 2020-12-16 NOTE — PROGRESS NOTES
Adult Partial Hospitalization Program:  Individualized Treatment Plan     Date of Plan: 20    Name: Glen Cardenas MRN: [id number]  :   2000    Programs: Adult Partial Hospitalization Program    DSM5 Primary Diagnosis: From Dr. Pendleton's H&P Documentation  Axis I:  Major depressive disorder, recurrent.  Generalized anxiety disorder.  Cannabis use disorder.   Axis II:  No diagnosis.   Axis III:  History of concussion 2020.       Adult Partial Hospitalization Program Multidisciplinary Team Members: NELSON Hardin, Jen [facility],  Nancy Tierney, RN, Anuj Womack, MOTR/L; Ana Walker, OTR/L; Aj Pendleton MD    Glen Cardenas will participate in the Adult Partial Hospitalization Program 5 days per week, 5 hours per day. Anticipated duration/discharge: 2 weeks    Due to COVID-19, services will be delivered via telemedicine until further notice.     Program Start Date: 20  Anticipated Discharge Date: 20 (pending authorization/clinical changes)    NOTE: Complete CGI     Glen Cardenas would be at reasonable risk of requiring inpatient hospitalization in the absence of partial hospitalization.     Review Date: Does Glen Cardenas continue to meet criteria to participate in the Partial Hospitalization Program, 5 days per week; 5 hours per day?   20 yes   20 Yes  No       Client Strengths:  creative, educated, empathetic, goal-focused, good listener, insightful, intelligent, motivated, open to learning, open to suggestions / feedback, supportive, wants to learn and willing to ask questions    Client Areas of Vulnerability:  Anxiety  Depressive symptoms   Educational Stressors    Client Participation in Plan:  Contributed to goals and plan   Attended individual treatment plan meeting on 20  Agrees with plan     Long-Term Goals:  Knowledge about illness and management of symptoms   Maintenance of personal safety     Abuse Prevention  "Plan:  Safe, therapeutic environment   Safety coping plan as needed   Education regarding illness and skill development     Discharge Criteria:  Satisfactory progress toward treatment goals   Improvement re: identified problems and symptoms   Ability to continue recovery at next level of service   Has a discharge plan in place   Has safety/coping plan in place        Areas of Treatment Focus       Area of Treatment Focus:  Personal Safety  Start Date:    12/16/20    Problem Description: (From DA completed 12/9) Safety Assessment: never attempted suicide, He said has thoughts of \"I just don't want to be here\".  \"None of these people would give a shit if I killed myself\"  He said that happens a few times a month usually in the the spring and fall. He said the thoughts last for a few seconds or minutes, may have the thoughts periodically through the day until he calls someone to talk about his feelings. He said he has had SI since high school.  He said when he has those thoughts they are a 3 or 4 as far as stress level, when he went to the ED it was a 5.      Goal: Target Date: 12/22, discharge Status: Completed  1) Safety:   Client will notify staff when needing assistance to develop or implement a coping plan to manage suicidal or self injurious urges.  Client will use coping plan for safety, as needed.    Progress:  12/16/20: Met with team members. Discussed program, process, and progress. Discussed and set treatment goals. Patient agrees with goal; continue until next review.   12/22/20: Patient reported no current safety concerns; continue until next review.   12/24/20: Recommend patient continue to address this goal area in individual therapy. No current safety concerns reported.     Treatment Strategies:  Assist clients in establishing / strengthening support network  Assist to identify treatment goals  Assist with discharge planning  Engage in safety planning when indicated  Facilitate increased " "self-awareness  Provide education regarding distress tolerance skills     Area of Treatment Focus:  Symptom Management  Start Date:    20    Description: (From DA completed )Chief Complaint:   The reason for seeking services at this time is: \" problems with depression and anxiety \"   The problem(s) began in high school. He said he has been having problems off and on.  He said last year he has been having a rough year.  His grandmother  and then his girlfriend broke up with him, felt like everything started going down hill. He said he had a concussion in 2020. He said he was diagnosed, he said the doctor just gave him basic recommendations, but he said he is still having effects with concentration and focus.   He said he started isolating and having more difficulty with irritability in the last month.  Patient has attempted to resolve these concerns in the past through individual therapy after parent's divorce, and off and on with Chip Ozuna --will continue to see him,  psychiatry at [facility], [facility].    Goal: Target Date: 20, discharge Status: Completed  2) In Psychotherapy groups Serafin will:   Identify and process emotions; process reactions to interpersonal interactions and learning/utilizing coping skills to cope in those situations. Patient would like address reactions (anger, \"explosions\" when people will say something that \"sets me off\") with his parents and sometimes his sister.      As measured by self report and staff observation during groups daily.       Progress:  20: Met with team members. Discussed program, process, and progress. Discussed and set treatment goals. Patient agrees with goal, continue until next review.  20: Patient consistently processes his emotions in psychotherapy groups and processes symptoms of his concussion. Patient reported \"explosions\" and anger has decreased and he has utilized grounding skills to improve his communication with " his family. Patient has been practicing gratitude and self-compassion to address mood symptoms. Continue goal until discharge.   12/24/20: Recommend patient continue to address this goal area in individual therapy.    Treatment Strategies:  Assist clients in establishing / strengthening support network  Assist to identify treatment goals  Assist with discharge planning  Engage in safety planning when indicated  Facilitate increased self awareness  Provide education regarding how to use group process, thoughts/behaviors/emotions management, emotional regulation, values identification, distorted thinking, grief and loss, shame, self compassion, self awareness, mindfulness, radical acceptance, distress tolerance skills, hope, problem solving. Communication/interpersonal effectiveness.      Area of Treatment Focus:  Develop / Improve Independent Living Skills  Start Date:    12/16/20    Description:   From DA:  Functional Status:  Patient reports the following functional impairments: academic performance, management of the household and or completion of tasks, relationship(s) and self-care.    From OT profile/assessment during OT sessions: Patient expresses desire to be more mindful of daily self cares, proactively handle school related stressors. Since concussion reports difficulty with focus/concentration adding to stressor profile.     Goal: Target Date: 12/22/20, discharge Status: Completed  3) In Occupational Therapy groups Serafin will:    Lifestyle health: Learn, practice, apply 2 strategies that support participation in meaningful activities with an emphasis on daily self cares to improve mental health management after discharge.     Self Regulation: Learn, practice, apply 2 body based self regulation skills for improved focus and distress tolerance to support participation in meaningful roles, relationships and responsibilities.     Resiliency development: Develop self awareness and strategies around effective  ways to proactively manage school related stressors from a self compassionate and culturally relevant perspective to support mental wellbeing.      As measured by self report and staff observation during groups daily.     Progress:  12/16/20: Met with team members. Discussed program, process, and progress. Discussed and set treatment goals. Goals developed with patient in context of OT sessions.   12/22/20: Patient reported he has consistently practiced grounding skills, taking breaks, gratitude and self-compassion. Continue goal until discharge.   12/24/20: Recommend patient continue to address this goal area in individual therapy.    Treatment Strategies:  Assist to identify treatment goals  Engage in safety planning when indicated  Facilitate increased self awareness  OT assessment  Provide education regarding:  Lifestyle Health: balance/structure/routine, goal setting and integration, prioritizing and planning, leisure values, behavior activation, weaving a mindful lifestyle and benefits of focused activity,.  Self regulation: sensory modulation, window of tolerance, ANS and vagus nerve activation, self awareness, development of a self regulation plan, sensory enhanced mindfulness, sensory/body based grounding skills.  Resiliency development: Motivation, transitions, energy management, self compassion, stress management, positive thought processes, neuroscience of change.        Area of Treatment Focus:  Community Resources/Discharge Planning  Start Date:    12/16/20    Description:   Psychiatrist/Med Mgmt: Chaitanya Lugo in [place] (follow-up appointment next week)   Individual Therapist: Chip Ozuna in [place]     Goal: Target Date: 12/22/20, discharge   Status: Completed    4) Wellness and Discharge preparation:     Will improve wellness related behaviors by attending wellness sessions and ask questions as they come up.    Will increase effective use of support / increase ability to ask for help. Invite  "someone to the Support Network Education group to discuss your support needs.    Will develop an aftercare / transition plan by scheduling on-going individual therapy and psychiatry appointments.     Progress:  12/16/2020: Met with team members. Discussed program, process, and progress. Discussed and set treatment goals. Patient and staff discussed day treatment and patient will discuss that treatment option with his parents.   12/22/20: Patient plans to continue with individual therapy and medication management services. Patient reported he will contact program staff if he is interested enrolling in the day treatment program.   12/24/20: Recommend patient continue to address this goal area in individual therapy.    Treatment Strategies:  Assist to identify treatment goals  Assist with discharge planning  Engage in safety planning when indicated  Facilitate increased self awareness  Provide education regarding eight dimensions of wellness. sleep hygiene. medication education and management. stigma. nutrition. signs and symptoms. community resources. support network education.      NELSON Love LPCC on 12/16/2020 at 12:22 PM  NELSON Love on 12/22/2020 at 10:29 AM  NELSON Love on 12/24/2020 at 12:18 PM        NOTE: Required signatures are completed manually and scanned into the electronic medical record. See \"Media\" tab in epic.    The Individualized Treatment Plan Signature Page has been routed to the provider for co-sign.     "

## 2020-12-16 NOTE — GROUP NOTE
Psychoeducation Group Note    PATIENT'S NAME: Glen Cardenas  MRN:   [id number]  :   2000  ACCT. NUMBER: [id number]  DATE OF SERVICE: 20  START TIME:  9:00 AM  END TIME:  9:50 AM  FACILITATOR: Hallie Tierney RN  TOPIC: DARINEL RN Group: [facility] Adult Partial Hospitalization Program  TRACK: 1    NUMBER OF PARTICIPANTS: 6    Summary of Group / Topics Discussed:  Foundations of Health: Nutrition: Super Nutrients & Micronutrients: Super Nutrients are Foods that have high nutritional yield. Micronutrients are essential elements found in food or taken through supplements that are necessary for normal physiological functioning. This group was designed to complement the macronutrients group and build upon previous education. The changes that food makes on the brain (how the brain uses sugar) and nutrition as it relates to mental health was also discussed.       Patient Session Goals / Objectives:  ? Identified the health enhancing benefits to good nutrition  ? Verbalized ways in which the food we eat affects the brain  ? Explained the role of micronutrients in the body, how much we need, and how to get it  Telemedicine Visit: The patient's condition can be safely assessed and treated via synchronous audio and visual telemedicine encounter.      Reason for Telemedicine Visit: Services only offered telehealth    Originating Site (Patient Location): Patient's home    Distant Site (Provider Location): Provider Remote Setting    Consent:  The patient/guardian has verbally consented to: the potential risks and benefits of telemedicine (video visit) versus in person care; bill my insurance or make self-payment for services provided; and responsibility for payment of non-covered services.     Mode of Communication:  Video Conference via [place]    As the provider I attest to compliance with applicable laws and regulations related to telemedicine.       Patient Participation /  Response:  Fully participated with the group by sharing personal reflections / insights and openly received / provided feedback with other participants.    Demonstrated understanding of topics discussed through group discussion and participation    Treatment Plan:  Patient has a current master individualized treatment plan.  See Epic treatment plan for more information.    Hallie Tierney RN

## 2020-12-16 NOTE — GROUP NOTE
Psychoeducation Group Note    PATIENT'S NAME: Glen Cardenas  MRN:   [id number]  :   2000  ACCT. NUMBER: [id number]  DATE OF SERVICE: 20  START TIME:  1:00 PM  END TIME:  1:50 PM  FACILITATOR: Anuj Womack OT  TOPIC: [facility] OT Group: Self- Regulation Skills  [facility] Adult Partial Hospitalization Program  TRACK: 1    Telemedicine Visit: The patient's condition can be safely assessed and treated via synchronous audio and visual telemedicine encounter.      Reason for Telemedicine Visit: Services only offered telehealth and Covid 19    Originating Site (Patient Location): Patient's home    Distant Site (Provider Location): Provider Remote Setting    Consent:  The patient/guardian has verbally consented to: the potential risks and benefits of telemedicine (video visit) versus in person care; bill my insurance or make self-payment for services provided; and responsibility for payment of non-covered services.     Mode of Communication:  Video Conference via [employer]    As the provider I attest to compliance with applicable laws and regulations related to telemedicine.      NUMBER OF PARTICIPANTS:6    Summary of Group / Topics Discussed:  Self-Regulation Skills: Sensory Modulation: Patients were provided with education on Autonomic Nervous System activation and the importance of identifying their Window of Tolerance for effective self-regulation.  Patients were taught how to recognize specific signs and symptoms of their individualized state of arousal and how to make changes when needed.  Patient's explored body based skills (bottom up processing skills) and techniques to help manage symptoms and change level of arousal when needed to be in control and comfortable so they are able to function in different environments.         Patient Session Goals / Objectives:    [place] how the ANS works and importance of its  impact on functioning and mental wellbeing    [place] how developing  interoceptive awareness can help one self-regulate sooner rather than later    Identified signs and symptoms of current level of arousal and ways to make changes in level of arousal when needed    Identified specific and individualized neurosensory skills to help when distressed and for crisis management    Established a plan for practice of these skills in their own environments        Patient Participation / Response:  Fully participated with the group by sharing personal reflections / insights and openly received / provided feedback with other participants.    Verbalized understanding of content and Patient would benefit from additional opportunities to practice the content to be able to generalize it to their everyday life with increased intentionality, consistency, and efficacy in support of their psychiatric recovery    Treatment Plan:  Patient has a current master individualized treatment plan.  See Epic treatment plan for more information.    Anuj Womack, OT

## 2020-12-16 NOTE — PROGRESS NOTES
[facility]   Dr. Pendleton's Psychiatric Progress Note  2020      Patient:  Glen Cardenas   Medical Record Number:  [id number]  :  2000    Telemedicine Visit: The patient's condition can be safely assessed and treated via synchronous audio and visual telemedicine encounter.       Reason for Telemedicine Visit: COVID-19 lockdown     Originating Site (Patient Location):  HOME     Distant Site (Provider Location): [facility]: [place]     Consent:  The patient/guardian has verbally consented to: the potential risks and benefits of telemedicine (video visit) versus in person care; bill my insurance or make self-payment for services provided; and responsibility for payment of non-covered services          Interim History:   The patient's care was discussed with the treatment team and chart notes were reviewed.  Group was good today.  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  He's got 2 finals yet.     Psychiatric ROS:  Mood:  Pretty good   Sleep:  Ok;  9:30 to 6 AM  Appetite:  normal  Eating:  normal  Energy Level:   good  Concentration/Memory: pretty good  Suicidal Thoughts:No  Homicidal Thoughts:No  Psychotic Symptoms: No  Medication Side Effects:No  Medication Compliance:Yes   Physical Complaints:negative         Medications:     PAST PSYCH MEDS:  Unknown stimulants, Celexa, Lexapro, Abilify    Current Outpatient Medications   Medication Sig     ARIPiprazole (ABILIFY) 2 MG tablet Take 2 mg by mouth daily     calcium carbonate (TUMS) 500 MG chewable tablet Take 1 chew tab by mouth daily     LEXAPRO 20MG Take 20mg daily     ranitidine (ZANTAC) 150 MG tablet Take 1 tablet (150 mg) by mouth 2 times daily (Patient not taking: Reported on 2020)     No current facility-administered medications for this encounter.              Allergies:   No Known Allergies         Psychiatric Examination:   There were no vitals taken for this  visit.  Weight is 0 lbs 0 oz  There is no height or weight on file to calculate BMI.    Appearance:  awake, alert and adequately groomed  Attitude:  cooperative  Eye Contact:  good  Mood:    better  Affect:  mood congruent  Speech:  clear, coherent  Psychomotor Behavior:  no evidence of tardive dyskinesia, dystonia, or tics  Throught Process:  logical  Associations:  no loose associations  Thought Content:  no evidence of suicidal ideation or homicidal ideation and no evidence of psychotic thought  Insight:  good  Judgement:  intact  Oriented to:  time, person, and place  Attention Span and Concentration:  intact  Recent and Remote Memory:  intact  Gait:Normal    Risk/Potential for Dangerousness:  Multiple Active Diagnoses:HIGH  Self Care:HIGH  Suicide:LOW  Assault:LOW  Self Injurious Behaviors:LOW  Inappropriate Sexual Behavior:LOW         Labs:   No results found for this or any previous visit (from the past 24 hour(s)).     No results found for this or any previous visit (from the past 1008 hour(s)).      Impression:   This is a 20 year old male continues PHP for mood stabilization.  Mood is improving.          DIagnoses:     Axis I:  Major depressive disorder, recurrent.  Generalized anxiety disorder.  Cannabis use disorder.   Axis II:  No diagnosis.   Axis III:  History of concussion 09/2020.          Plan:     Continue [facility].   Continue psychotropic medications.  He was just switched from Celexa to Lexapro.   Switch Abilify from HS to AM.  Would consider augmentation with Wellbutrin or switching to an SNRI if the Lexapro is not helpful.     Aj Pendleton MD

## 2020-12-16 NOTE — GROUP NOTE
Psychotherapy Group Note    PATIENT'S NAME: Glen Cardenas  MRN:   [id number]  :   2000  ACCT. NUMBER: [id number]  DATE OF SERVICE: 20  START TIME:  2:00 PM  END TIME:  2:50 PM  FACILITATOR: Jen Meyer LICSW  TOPIC:  EBP Group: Enhanced Mindfulness  [facility] Adult Partial Hospitalization Program  TRACK: 1    NUMBER OF PARTICIPANTS: 7    Summary of Group / Topics Discussed:  Enhanced Mindfulness: Body and Mind Integration: Patients received an overview and education regarding the importance of including the body in the management of emotional health and self-care and as a direct route to mindfulness practice.  Patients discussed various ways in which the body can serve as an informant to their physical and emotional experiences/need. Patients discussed the body as a direct link to the present moment and to mindfulness practice.  Patients discussed current relationship with body, self-awareness, mindfulness practice and barriers to connection with body.  Patients were guided through breath work and movement to facilitate greater self-awareness, grounding, self-expression, and connection to other.  Patients discussed how the experiential could be applied to better manage mental health and develop watson connection to self.    Patient Session Goals / Objectives:    Identify how movement awareness could be used for grounding, stress management, self-expression, connection to other and self-regulation    Improved awareness of breath and movement preferences    Identify how movement and the body is used in mindfulness practice    Reflect on use of these practices in everyday life.    Identify barriers to attending to body    Telemedicine Visit: The patient's condition can be safely assessed and treated via synchronous audio and visual telemedicine encounter.          Reason for Telemedicine Visit: Services only offered telehealth and covid19        Originating Site (Patient Location):  Patient's home        Distant Site (Provider Location): Provider Remote Setting        Consent:  The patient/guardian has verbally consented to: the potential risks and benefits of telemedicine (video visit) versus in person care; bill my insurance or make self-payment for services provided; and responsibility for payment of non-covered services.         Mode of Communication:  Video Conference via [employer] B[employer]        As the provider I attest to compliance with applicable laws and regulations related to telemedicine.         Patient Participation / Response:  Moderately participated, sharing some personal reflections / insights and adequately adequately received / provided feedback with other participants.    Demonstrated understanding of topics discussed through group discussion and participation and Practiced skills in session    Treatment Plan:  Patient has a current master individualized treatment plan.  See Epic treatment plan for more information.    LISS BellSW

## 2020-12-17 ENCOUNTER — HOSPITAL ENCOUNTER (OUTPATIENT)
Dept: BEHAVIORAL HEALTH | Facility: CLINIC | Age: 20
End: 2020-12-17
Attending: PSYCHIATRY & NEUROLOGY
Payer: COMMERCIAL

## 2020-12-17 PROCEDURE — H0035 MH PARTIAL HOSP TX UNDER 24H: HCPCS | Mod: GT | Performed by: COUNSELOR

## 2020-12-17 PROCEDURE — H0035 MH PARTIAL HOSP TX UNDER 24H: HCPCS | Mod: 95

## 2020-12-17 NOTE — GROUP NOTE
Process Group Note    PATIENT'S NAME: Glen Cardenas  MRN:   [id number]  :   2000  ACCT. NUMBER: [id number]  DATE OF SERVICE: 20  START TIME:  9:00 AM  END TIME:  9:50 AM  FACILITATOR: Carolina Chen LPCC  TOPIC:  Process Group    Diagnoses:  Axis I:  Major depressive disorder, recurrent.  Generalized anxiety disorder.  Cannabis use disorder.   Axis II:  No diagnosis.   Axis III:  History of concussion 2020.       [facility] Adult Partial Hospitalization Program  TRACK: [facility]    NUMBER OF PARTICIPANTS: 7    Telemedicine Visit: The patient's condition can be safely assessed and treated via synchronous audio and visual telemedicine encounter.      Reason for Telemedicine Visit: Services only offered telehealth and due to COVID-19    Originating Site (Patient Location): Patient's home    Distant Site (Provider Location): Provider Remote Setting    Consent:  The patient/guardian has verbally consented to: the potential risks and benefits of telemedicine (video visit) versus in person care; bill my insurance or make self-payment for services provided; and responsibility for payment of non-covered services.     Mode of Communication:  Video Conference via [employer]    As the provider I attest to compliance with applicable laws and regulations related to telemedicine.            Data:    Session content: At the start of this group, patients were invited to check in by identifying themselves, describing their current emotional status, and identifying issues to address in this group.   Area(s) of treatment focus addressed in this session included Symptom Management, Personal Safety and Community Resources/Discharge Planning.    Patient reported feeling tired and overwhelmed with school stressors. Patient discussed working toward finishing homework. Patient identified taking a cold shower as skills they will use to address their goal(s). Patient reported fatigue may be a barrier to working  toward their goal(s) and/or addressing mental health symptoms. Patient reported no safety concerns and/or self-injurious behaviors. Patient reported yes substance use. Patient reported they are taking their medications as prescribed. Patient reported feeling proud that they have been completing homeowrk. Patient discussed completing his semester next week with the treatment group.     Therapeutic Interventions/Treatment Strategies:  Psychotherapist offered support, feedback and validation and reinforced use of skills. Treatment modalities used include Motivational Interviewing and Cognitive Behavioral Therapy. Interventions include Coping Skills: Assisted patient in identifying 1-2 healthy distraction skills to reduce overall distress, Symptoms Management: Promoted understanding of their diagnoses and how it impacts their functioning and Emotions Management:  Discussed barriers to emotional regulation.    Assessment:    Patient response:   Patient responded to session by accepting feedback, giving feedback, listening, focusing on goals, being attentive and accepting support    Possible barriers to participation / learning include: and no barriers identified    Health Issues:   None reported       Substance Use Review:   Substance Use: Last use: Cannabis yesterday    Mental Status/Behavioral Observations  Appearance:   Appropriate   Eye Contact:   Good   Psychomotor Behavior: Normal   Attitude:   Cooperative   Orientation:   All  Speech   Rate / Production: Normal/ Responsive Normal    Volume:  Normal   Mood:    Anxious  Depressed  Normal  Affect:    Appropriate   Thought Content:   Clear and Safety denies any current safety concerns including suicidal ideation, self-harm, and homicidal ideation  Thought Form:  Coherent  Logical     Insight:    Good     Plan:     Safety Plan: No current safety concerns identified.  Recommended that patient call 911 or go to the local ED should there be a change in any of these risk  factors.     Barriers to treatment: None identified    Patient Contracts (see media tab):  None    Substance Use: Provided encouragement towards sobriety     Continue or Discharge: Patient will continue in Adult Partial Hospitalization Program (PHP)  as planned. Patient is likely to benefit from learning and using skills as they work toward the goals identified in their treatment plan.      Carolina Chen, [facility]  December 17, 2020

## 2020-12-17 NOTE — GROUP NOTE
Psychoeducation Group Note    PATIENT'S NAME: Glen Cardenas  MRN:   [id number]  :   2000  ACCT. NUMBER: [id number]  DATE OF SERVICE: 20  START TIME:  1:00 PM  END TIME:  1:50 PM  FACILITATOR: Sosa Green RN  TOPIC: DARINEL RN Group: Brain Health  [facility] Adult Partial Hospitalization Program  TRACK: 1    NUMBER OF PARTICIPANTS: 7    Telemedicine Visit: The patient's condition can be safely assessed and treated via synchronous audio and visual telemedicine encounter.      Reason for Telemedicine Visit: Services only offered telehealth    Originating Site (Patient Location): Patient's home    Distant Site (Provider Location): Provider Remote Setting    Consent:  The patient/guardian has verbally consented to: the potential risks and benefits of telemedicine (video visit) versus in person care; bill my insurance or make self-payment for services provided; and responsibility for payment of non-covered services.     Mode of Communication:  Video Conference via Txt4    As the provider I attest to compliance with applicable laws and regulations related to telemedicine.    Summary of Group / Topics Discussed:  Brain Health:  Pathophysiology of Mood Disorders: Patients were educated on mood disorder etiology and neuroscience, risk factors, symptoms, and pharmacologic, psychotherapeutic, and complementary treatment options. Patients were guided on a discussion of mental, behavioral, and physical symptoms and shared their symptoms with the group.     Patient Session Goals / Objectives:  ? Described what mood disorders are and identified risk factors   ? Explained how chemical imbalances in the brain can cause symptoms and how medications work to reverse this imbalance   ? Identified and described pharmacologic, psychotherapeutic, and complementary treatment options      Patient Participation / Response:  Fully participated with the group by sharing personal reflections / insights and  openly received / provided feedback with other participants.    Demonstrated understanding of topics discussed through group discussion and participation, Identified / Expressed personal readiness to practice skills and Verbalized understanding of brain health topic    Treatment Plan:  Patient has a current master individualized treatment plan.  See Epic treatment plan for more information.    Sosa Green RN

## 2020-12-17 NOTE — GROUP NOTE
Psychotherapy Group Note    PATIENT'S NAME: Glen Cardenas  MRN:   [id number]  :   2000  ACCT. NUMBER: [id number]  DATE OF SERVICE: 20  START TIME: 10:00 AM  END TIME: 10:50 AM  FACILITATOR: Carolina Chen LPCC  TOPIC:  EBP Group: Specialty Awareness  [facility] Adult Partial Hospitalization Program  TRACK: [facility]    NUMBER OF PARTICIPANTS: 7    Summary of Group / Topics Discussed:  Specialty Topics: Grief/Transitions: Patients received an overview of the grief process.  Patients explored their relationship to loss and how that has affected their mental health symptoms.  Strategies for recognizing loss and ideas for engaging in the grief process was presented and discussed.  Patients identified needs for support and coping skills to manage loss.  The purpose of this specialty topic is to help patients identify the aspects of change due to loss that individuals experience in addition to mental health symptoms to better cope with the grief, loss, and life transitions.      Patient Session Goals / Objectives:    Identified grief, loss, and life transitions     Discussed how grief impacts mental health symptoms and disrupts usual functioning    Identified needs for support and coping with grief and planned further action for coping  Telemedicine Visit: The patient's condition can be safely assessed and treated via synchronous audio and visual telemedicine encounter.      Reason for Telemedicine Visit: Services only offered telehealth and due to COVID-19    Originating Site (Patient Location): Patient's home    Distant Site (Provider Location): Provider Remote Setting    Consent:  The patient/guardian has verbally consented to: the potential risks and benefits of telemedicine (video visit) versus in person care; bill my insurance or make self-payment for services provided; and responsibility for payment of non-covered services.     Mode of Communication:  Video Conference via  Delvis    As the provider I attest to compliance with applicable laws and regulations related to telemedicine.        Patient Participation / Response:  Fully participated with the group by sharing personal reflections / insights and openly received / provided feedback with other participants.    Demonstrated understanding of topics discussed through group discussion and participation, Identified / Expressed readiness to act on skill suggestions discussed in topic and Verbalized understanding of ways to proactively manage illness    Treatment Plan:  Patient has a current master individualized treatment plan and today was our weekly review of the patient's progress.  See Epic treatment plan for progress / updates on goals and plan.    Carolina Chen, [facility]

## 2020-12-17 NOTE — GROUP NOTE
Psychotherapy Group Note    PATIENT'S NAME: Glen Cardenas  MRN:   [id number]  :   2000  ACCT. NUMBER: [id number]  DATE OF SERVICE: 20  START TIME:  2:00 PM  END TIME:  2:50 PM  FACILITATOR: Carolina Chen LPCC  TOPIC: MH EBP Group: Relationship Skills  [facility] Adult Partial Hospitalization Program  TRACK: [facility]    NUMBER OF PARTICIPANTS: 6    Summary of Group / Topics Discussed:  Relationship Skills: Boundaries: Patients were provided with a general overview of interpersonal boundaries and how lack of boundaries relates to symptoms and functioning. The purpose is to help patients identify boundary issues and gain awareness and skills to work towards healthier interpersonal boundaries. Current awareness of healthy boundary characteristics and barriers to establishing healthy boundaries were discussed.    Patient Session Goals / Objectives:    Familiarized patients with the concept of interpersonal boundaries and their characteristics    Discussed and practiced strategies to promote healthier interpersonal boundaries    Identified boundary issues and identified plan to improve boundaries    Telemedicine Visit: The patient's condition can be safely assessed and treated via synchronous audio and visual telemedicine encounter.      Reason for Telemedicine Visit: Services only offered telehealth and due to COVID-19    Originating Site (Patient Location): Patient's home    Distant Site (Provider Location): Provider Remote Setting    Consent:  The patient/guardian has verbally consented to: the potential risks and benefits of telemedicine (video visit) versus in person care; bill my insurance or make self-payment for services provided; and responsibility for payment of non-covered services.     Mode of Communication:  Video Conference via [employer]    As the provider I attest to compliance with applicable laws and regulations related to telemedicine.        Patient Participation /  Response:  Fully participated with the group by sharing personal reflections / insights and openly received / provided feedback with other participants.    Demonstrated understanding of topics discussed through group discussion and participation, Demonstrated understanding of relationship skills and communication skills and Identified / Expressed personal readiness to incorporate effective communication skills    Treatment Plan:  Patient has a current master individualized treatment plan.  See Epic treatment plan for more information.    Carolina Chen, LPCC

## 2020-12-18 ENCOUNTER — HOSPITAL ENCOUNTER (OUTPATIENT)
Dept: BEHAVIORAL HEALTH | Facility: CLINIC | Age: 20
Discharge: HOME | End: 2020-12-18
Attending: PSYCHIATRY & NEUROLOGY | Admitting: PSYCHIATRY & NEUROLOGY
Payer: COMMERCIAL

## 2020-12-18 ENCOUNTER — HOSPITAL ENCOUNTER (OUTPATIENT)
Dept: BEHAVIORAL HEALTH | Facility: CLINIC | Age: 20
End: 2020-12-18
Attending: PSYCHIATRY & NEUROLOGY
Payer: COMMERCIAL

## 2020-12-18 PROCEDURE — H0035 MH PARTIAL HOSP TX UNDER 24H: HCPCS | Mod: GT | Performed by: COUNSELOR

## 2020-12-18 PROCEDURE — H0035 MH PARTIAL HOSP TX UNDER 24H: HCPCS | Mod: GT | Performed by: OCCUPATIONAL THERAPIST

## 2020-12-18 PROCEDURE — H0035 MH PARTIAL HOSP TX UNDER 24H: HCPCS | Mod: GT

## 2020-12-18 PROCEDURE — H0035 MH PARTIAL HOSP TX UNDER 24H: HCPCS | Mod: 95 | Performed by: SOCIAL WORKER

## 2020-12-18 NOTE — GROUP NOTE
Psychotherapy Group Note    PATIENT'S NAME: Glen Cardenas  MRN:   [id number]  :   2000  ACCT. NUMBER: [id number]  DATE OF SERVICE: 20  START TIME:  2:00 PM  END TIME:  2:50 PM  FACILITATOR: Jen Meyer LICSW  TOPIC:  EBP Group: Self-Awareness  [facility] Adult Partial Hospitalization Program  TRACK: 1    NUMBER OF PARTICIPANTS: 6    Summary of Group / Topics Discussed:  Self-Awareness: Self-Compassion: Patients received overview of key concepts in developing self-compassion. Patients discussed mindfulness, self-kindness, and finding common humanity. Patients identified their current approach to problems in their lives and learned skills for increasing self-compassion. Patients identified ways they can put self-compassion skills into practice and problem solve barriers to application of skills.     Patient Session Goals / Objectives:    Gananda components of self-compassion    Identify ways to practice self-compassion in daily life    Problem solve barriers to self-compassion practice    Telemedicine Visit: The patient's condition can be safely assessed and treated via synchronous audio and visual telemedicine encounter.          Reason for Telemedicine Visit: Services only offered telehealth and covid19        Originating Site (Patient Location): Patient's home        Distant Site (Provider Location): Provider Remote Setting        Consent:  The patient/guardian has verbally consented to: the potential risks and benefits of telemedicine (video visit) versus in person care; bill my insurance or make self-payment for services provided; and responsibility for payment of non-covered services.         Mode of Communication:  Video Conference via [employer]        As the provider I attest to compliance with applicable laws and regulations related to telemedicine.       Patient Participation / Response:  Moderately participated, sharing some personal reflections / insights and adequately  adequately received / provided feedback with other participants.    Demonstrated understanding of topics discussed through group discussion and participation and Identified / Expressed readiness to act intentionally, increase self-compassion, promote personal growth    Treatment Plan:  Patient has a current master individualized treatment plan.  See Epic treatment plan for more information.    LISS BellSW

## 2020-12-18 NOTE — GROUP NOTE
Psychoeducation Group Note    PATIENT'S NAME: Glen Cardenas  MRN:   [id number]  :   2000  ACCT. NUMBER: [id number]  DATE OF SERVICE: 20  START TIME: 11:00 AM  END TIME: 11:50 AM  FACILITATOR: Ana Walker OTR/L  TOPIC: MH PHP OT Group: Partial Hospitalization Program- Occupational Therapy  [facility] Adult Partial Hospitalization Program  TRACK: 1    NUMBER OF PARTICIPANTS: 6    Telemedicine Visit: The patient's condition can be safely assessed and treated via synchronous audio and visual telemedicine encounter.      Reason for Telemedicine Visit: Services only offered telehealth    Originating Site (Patient Location): Patient's home    Distant Site (Provider Location): [facility]    Consent:  The patient/guardian has verbally consented to: the potential risks and benefits of telemedicine (video visit) versus in person care; bill my insurance or make self-payment for services provided; and responsibility for payment of non-covered services.     Mode of Communication:  Video Conference via Zoom    As the provider I attest to compliance with applicable laws and regulations related to telemedicine.     Summary of Group / Topics Discussed:  Lifestyle Balance and Structure:  OT Goal-setting & integration: Weekend Wellness: The group focused on reflecting on the past week and identifying insights and positive experiences that they want to build upon further.  The group also focused on identifying needs and any concerns for the upcoming weekend and created a list of activities and tasks that they might engage in to help support themselves and add structure their weekend.  Facilitated the sharing of individual insights and plans with validation, support, and feedback provided.    Patient Session Goals / Objectives:    Reflected on insights learned and positive experiences over the last week to help with their recovery and  wellbeing    Identified needs and concerns for the upcoming weekend and identified ways to address these    Created a written list of possible ways to structure their weekend    Identified plan to support follow through on plans and reflection on progress made         Patient Participation / Response:  Moderately participated, sharing some personal reflections / insights and adequately adequately received / provided feedback with other participants.    Patient presentation: appeared more distracted today than usual; able to identify plans for the weekend and getting through finals; reported feeling good about the upcoming weekend, Verbalized understanding of content and Patient would benefit from additional opportunities to practice the content to be able to generalize it to their everyday life with increased intentionality, consistency, and efficacy in support of their psychiatric recovery    Treatment Plan:  Patient has a current master individualized treatment plan.  See Epic treatment plan for more information.    Ana Walker, OTR/L

## 2020-12-18 NOTE — GROUP NOTE
Psychotherapy Group Note    PATIENT'S NAME: Glen Cardenas  MRN:   [id number]  :   2000  ACCT. NUMBER: [id number]  DATE OF SERVICE: 20  START TIME:  1:00 PM  END TIME:  1:50 PM  FACILITATOR: Carolina Chen LPCC  TOPIC: MH EBP Group: Specialty Awareness  [facility] Adult Partial Hospitalization Program  TRACK: [facility]    NUMBER OF PARTICIPANTS: 6    Summary of Group / Topics Discussed:  Specialty Topics: Education Support Network: Patients worked on identifying the mild, moderate, and severe symptoms of their disorder.  Patients identified helpful supportive statements and actions they would appreciate from caregivers.  The goal is to gather information in preparation for the education support network for problem solving and planning for support with caregivers.    Patient Session Goals / Objectives:    Understanding diagnoses and accompanying physical reactions, thoughts, and behaviors.      Explored options to support patients in their recovery     Formulated a plan with caregivers for assistance and support to aid in recovery     Problem solved barriers to follow through on plan    Telemedicine Visit: The patient's condition can be safely assessed and treated via synchronous audio and visual telemedicine encounter.      Reason for Telemedicine Visit: Services only offered telehealth and due to COVID-19    Originating Site (Patient Location): Patient's home    Distant Site (Provider Location): Provider Remote Setting    Consent:  The patient/guardian has verbally consented to: the potential risks and benefits of telemedicine (video visit) versus in person care; bill my insurance or make self-payment for services provided; and responsibility for payment of non-covered services.     Mode of Communication:  Video Conference via [employer]    As the provider I attest to compliance with applicable laws and regulations related to telemedicine.        Patient Participation / Response:  Fully  participated with the group by sharing personal reflections / insights and openly received / provided feedback with other participants.    Demonstrated understanding of topics discussed through group discussion and participation, Identified / Expressed readiness to act on skill suggestions discussed in topic and Verbalized understanding of ways to proactively manage illness    Treatment Plan:  Patient has a current master individualized treatment plan.  See Epic treatment plan for more information.    Carolina Chen, [facility]

## 2020-12-18 NOTE — GROUP NOTE
Psychoeducation Group Note    PATIENT'S NAME: Glen Cardenas  MRN:   [id number]  :   2000  ACCT. NUMBER: [id number]  DATE OF SERVICE: 20  START TIME: 10:00 AM  END TIME: 10:50 AM  FACILITATOR: Anuj Womack OT  TOPIC: [facility] OT Group: Self- Regulation Skills  [facility] Adult Partial Hospitalization Program  TRACK: 1    Telemedicine Visit: The patient's condition can be safely assessed and treated via synchronous audio and visual telemedicine encounter.      Reason for Telemedicine Visit: Services only offered telehealth and Covid 19    Originating Site (Patient Location): Patient's home    Distant Site (Provider Location): Provider Remote Setting    Consent:  The patient/guardian has verbally consented to: the potential risks and benefits of telemedicine (video visit) versus in person care; bill my insurance or make self-payment for services provided; and responsibility for payment of non-covered services.     Mode of Communication:  Video Conference via [facility]    As the provider I attest to compliance with applicable laws and regulations related to telemedicine.      NUMBER OF PARTICIPANTS: 6    Summary of Group / Topics Discussed:  Occupational Therapy: Lifestyle Health:  Weaving a Mindful Lifestyle through mindful moments     To demystify mindfulness and leverage its benefits by identifying ways to make it more accessible, patients were provided with education on the why, what & how, and ways to practice mindfulness through chosen activities using the metaphor of the components of a weaving (warp/weft). To improve self-efficacy with mindfulness, group members explored and applied the concept of levels of brain activation to help them identify what type of mindful activities might be most helpful depending on context and what part of the brain is dominating their attention: (brainstem, midbrain, cerebellum, limbic, cortex). Group members were led through a structured  experiential process where as a group they worked to create a mindful weaving of a variety of activities in context for each level of brain activation. These included: grounding activities, activities of daily living, informal sensorimotor activities, connecting activities, focused cognitive activities, and lastly formal meditative activities such as guided imagery or mindful movement such as yoga or edna chi. Patients shared their reflections and exchanged ideas and validation with each other.      Patient Session Goals / Objectives:    Learn about the why, what & how, and ways to create a lifestyle health by brainstorming and beginning to weave a mindful lifestyle one moment at a time.     Demystify the concept of mindfulness and apply curiosity and openness to practicing mindfulness in ways that honor context, are accessible and attune to individual patient values.     Identify two activities that they can apply taught concepts to in the development of a mindful lifestyle.    Identify strategies and skills to meet specific needs and address barriers.          Patient Participation / Response:  Fully participated with the group by sharing personal reflections / insights and openly received / provided feedback with other participants.    Verbalized understanding of content and Patient would benefit from additional opportunities to practice the content to be able to generalize it to their everyday life with increased intentionality, consistency, and efficacy in support of their psychiatric recovery    Treatment Plan:  Patient has a current master individualized treatment plan.  See Epic treatment plan for more information.    Anuj Womack, OT

## 2020-12-18 NOTE — GROUP NOTE
Process Group Note    PATIENT'S NAME: Glen Cardenas  MRN:   [id number]  :   2000  ACCT. NUMBER: [id number]  DATE OF SERVICE: 20  START TIME:  9:00 AM  END TIME:  9:50 AM  FACILITATOR: Carolina Chen LPCC  TOPIC:  Process Group    Diagnoses:  Axis I:  Major depressive disorder, recurrent.  Generalized anxiety disorder.  Cannabis use disorder.   Axis II:  No diagnosis.   Axis III:  History of concussion 2020.       [facility] Adult Partial Hospitalization Program  TRACK: [facility]    NUMBER OF PARTICIPANTS: 5    Telemedicine Visit: The patient's condition can be safely assessed and treated via synchronous audio and visual telemedicine encounter.      Reason for Telemedicine Visit: Services only offered telehealth and due to COVID-19    Originating Site (Patient Location): Patient's home    Distant Site (Provider Location): Provider Remote Setting    Consent:  The patient/guardian has verbally consented to: the potential risks and benefits of telemedicine (video visit) versus in person care; bill my insurance or make self-payment for services provided; and responsibility for payment of non-covered services.     Mode of Communication:  Video Conference via [employer]    As the provider I attest to compliance with applicable laws and regulations related to telemedicine.          Data:    Session content: At the start of this group, patients were invited to check in by identifying themselves, describing their current emotional status, and identifying issues to address in this group.   Area(s) of treatment focus addressed in this session included Symptom Management, Personal Safety and Community Resources/Discharge Planning.    Patient reported feeling alright and tired. Patient discussed working toward finishing coursework. Patient identified positive affirmations and challenging negative thoughts as skills they will use to address their goal(s). Patient reported the weekend may be a  barrier to working toward their goal(s) and/or addressing mental health symptoms. Patient reported no safety concerns and/or self-injurious behaviors. Patient reported yes substance use. Patient reported they are taking their medications as prescribed. Patient reported feeling proud that they made it through another week. Patient discussed trying to finish his last final over the weekend with the treatment group.     Therapeutic Interventions/Treatment Strategies:  Psychotherapist offered support, feedback and validation and reinforced use of skills. Treatment modalities used include Motivational Interviewing and Cognitive Behavioral Therapy. Interventions include Coping Skills: Assisted patient in identifying 1-2 healthy distraction skills to reduce overall distress, Symptoms Management: Promoted understanding of their diagnoses and how it impacts their functioning and Emotions Management:  Discussed barriers to emotional regulation.    Assessment:    Patient response:   Patient responded to session by accepting feedback, giving feedback, listening, focusing on goals, being attentive and accepting support    Possible barriers to participation / learning include: and no barriers identified    Health Issues:   None reported       Substance Use Review:   Substance Use: Last use: Cannabis yesterday    Mental Status/Behavioral Observations  Appearance:   Appropriate   Eye Contact:   Good   Psychomotor Behavior: Normal   Attitude:   Cooperative   Orientation:   All  Speech   Rate / Production: Normal/ Responsive Normal    Volume:  Normal   Mood:    Anxious  Depressed  Normal  Affect:    Appropriate   Thought Content:   Clear and Safety denies any current safety concerns including suicidal ideation, self-harm, and homicidal ideation  Thought Form:  Coherent  Logical     Insight:    Good     Plan:     Safety Plan: No current safety concerns identified.  Recommended that patient call 911 or go to the local ED should there  be a change in any of these risk factors.     Barriers to treatment: None identified    Patient Contracts (see media tab):  None    Substance Use: Provided encouragement towards sobriety     Continue or Discharge: Patient will continue in Adult Partial Hospitalization Program (PHP)  as planned. Patient is likely to benefit from learning and using skills as they work toward the goals identified in their treatment plan.      Carolina Chen, [facility]  December 18, 2020

## 2020-12-18 NOTE — PROGRESS NOTES
[facility]   Dr. Pendleton's Psychiatric Progress Note  2020      Patient:  Glen Cardenas   Medical Record Number:  [id number]  :  2000    Telemedicine Visit: The patient's condition can be safely assessed and treated via synchronous audio and visual telemedicine encounter.       Reason for Telemedicine Visit: COVID-19 lockdown     Originating Site (Patient Location):  HOME     Distant Site (Provider Location): [facility]: [place]     Consent:  The patient/guardian has verbally consented to: the potential risks and benefits of telemedicine (video visit) versus in person care; bill my insurance or make self-payment for services provided; and responsibility for payment of non-covered services          Interim History:   The patient's care was discussed with the treatment team and chart notes were reviewed.  Feels good today.  He'll do homework this weekend and finish up the .      Psychiatric ROS:  Mood:  good  Sleep:  good  Appetite:  normal  Eating:  normal  Energy Level:   good  Concentration/Memory: pretty good  Suicidal Thoughts:No  Homicidal Thoughts:No  Psychotic Symptoms: No  Medication Side Effects:No  Medication Compliance:Yes   Physical Complaints:negative         Medications:     PAST PSYCH MEDS:  Unknown stimulants, Celexa, Lexapro, Abilify    Current Outpatient Medications   Medication Sig     ARIPiprazole (ABILIFY) 2 MG tablet Take 2 mg by mouth daily     calcium carbonate (TUMS) 500 MG chewable tablet Take 1 chew tab by mouth daily     LEXAPRO 20MG Take 20mg daily     ranitidine (ZANTAC) 150 MG tablet Take 1 tablet (150 mg) by mouth 2 times daily (Patient not taking: Reported on 2020)     No current facility-administered medications for this encounter.              Allergies:   No Known Allergies         Psychiatric Examination:   There were no vitals taken for this visit.  Weight is 0 lbs 0 oz  There is no height or weight  on file to calculate BMI.    Appearance:  awake, alert and adequately groomed  Attitude:  cooperative  Eye Contact:  good  Mood:    good  Affect:  mood congruent  Speech:  clear, coherent  Psychomotor Behavior:  no evidence of tardive dyskinesia, dystonia, or tics  Throught Process:  logical  Associations:  no loose associations  Thought Content:  no evidence of suicidal ideation or homicidal ideation and no evidence of psychotic thought  Insight:  good  Judgement:  intact  Oriented to:  time, person, and place  Attention Span and Concentration:  intact  Recent and Remote Memory:  intact  Gait:Normal    Risk/Potential for Dangerousness:  Multiple Active Diagnoses:HIGH  Self Care:HIGH  Suicide:LOW  Assault:LOW  Self Injurious Behaviors:LOW  Inappropriate Sexual Behavior:LOW         Labs:   No results found for this or any previous visit (from the past 24 hour(s)).     No results found for this or any previous visit (from the past 1008 hour(s)).      Impression:   This is a 20 year old male continues PHP for mood stabilization.  Mood is improving.           DIagnoses:     Axis I:  Major depressive disorder, recurrent.  Generalized anxiety disorder.  Cannabis use disorder.   Axis II:  No diagnosis.   Axis III:  History of concussion 09/2020.          Plan:     Continue [facility] Program.   Continue psychotropic medications.  He was just switched from Celexa to Lexapro.   Switched Abilify from HS to AM.  Would consider augmentation with Wellbutrin or switching to an SNRI if the Lexapro is not helpful.     Aj Pendleton MD

## 2020-12-21 ENCOUNTER — HOSPITAL ENCOUNTER (OUTPATIENT)
Dept: BEHAVIORAL HEALTH | Facility: CLINIC | Age: 20
End: 2020-12-21
Attending: PSYCHIATRY & NEUROLOGY
Payer: COMMERCIAL

## 2020-12-21 PROCEDURE — H0035 MH PARTIAL HOSP TX UNDER 24H: HCPCS | Mod: 95 | Performed by: OCCUPATIONAL THERAPIST

## 2020-12-21 PROCEDURE — H0035 MH PARTIAL HOSP TX UNDER 24H: HCPCS | Mod: 95

## 2020-12-21 PROCEDURE — H0035 MH PARTIAL HOSP TX UNDER 24H: HCPCS | Mod: GT | Performed by: COUNSELOR

## 2020-12-21 NOTE — GROUP NOTE
"Psychoeducation Group Note    PATIENT'S NAME: Glen Cardenas  MRN:   [id number]  :   2000  ACCT. NUMBER: [id number]  DATE OF SERVICE: 20  START TIME: 11:00 AM  END TIME: 11:50 AM  FACILITATOR: Hallie Tierney RN  TOPIC: MH RN Group: [facility] Adult Partial Hospitalization Program  TRACK: 1    NUMBER OF PARTICIPANTS: 6    Summary of Group / Topics Discussed:  [facility]:  [facility]:life tree exercise  patients will have the opportunity to participate in a non-analytical exercise that highlights positives, assists to \"take stock\" of values, goals, and positive influences in our lives.    Patient Session Goals / Objectives:  ? Pts will participate in activity  ? Pts will share some or all of the activity with the group  Telemedicine Visit: The patient's condition can be safely assessed and treated via synchronous audio and visual telemedicine encounter.      Reason for Telemedicine Visit: Services only offered telehealth    Originating Site (Patient Location): Patient's home    Distant Site (Provider Location): Provider Remote Setting    Consent:  The patient/guardian has verbally consented to: the potential risks and benefits of telemedicine (video visit) versus in person care; bill my insurance or make self-payment for services provided; and responsibility for payment of non-covered services.     Mode of Communication:  Video Conference via [employer]    As the provider I attest to compliance with applicable laws and regulations related to telemedicine.               Patient Participation / Response:  Fully participated with the group by sharing personal reflections / insights and openly received / provided feedback with other participants.    Demonstrated understanding of topics discussed through group discussion and participation    Treatment Plan:  Patient has a current master individualized treatment plan.  See Epic treatment plan for more " information.    Hallie Tierney RN

## 2020-12-21 NOTE — GROUP NOTE
Psychotherapy Group Note    PATIENT'S NAME: Glen Cardenas  MRN:   [id number]  :   2000  ACCT. NUMBER: [id number]  DATE OF SERVICE: 20  START TIME:  2:00 PM  END TIME:  2:50 PM  FACILITATOR: Carolina Chen LPCC  TOPIC: MH EBP Group: Relationship Skills  [facility] Adult Partial Hospitalization Program  TRACK: [facility]    NUMBER OF PARTICIPANTS: 6    Summary of Group / Topics Discussed:  Relationship Skills: Basic Communication: Patients were provided with a general overview of interpersonal communication skills and information about how communication skills impact symptoms and functioning. The goal of this topic is to help patients identify communication issues and gain skills to work towards healthier interpersonal communication. Patients reviewed their current awareness of communication issues and how communication skills impact relationships and functioning.      Patient Session Goals / Objectives:    Identified and discussed patients individual challenges with basic communication    Presented and practiced effective communication skills in session    Assisted patients in implementing more effective communication skills in their relationships    Telemedicine Visit: The patient's condition can be safely assessed and treated via synchronous audio and visual telemedicine encounter.      Reason for Telemedicine Visit: Services only offered telehealth and due to COVID-19    Originating Site (Patient Location): Patient's home    Distant Site (Provider Location): Provider Remote Setting    Consent:  The patient/guardian has verbally consented to: the potential risks and benefits of telemedicine (video visit) versus in person care; bill my insurance or make self-payment for services provided; and responsibility for payment of non-covered services.     Mode of Communication:  Video Conference via [employer]    As the provider I attest to compliance with applicable laws and regulations related to  telemedicine.        Patient Participation / Response:  Fully participated with the group by sharing personal reflections / insights and openly received / provided feedback with other participants.    Demonstrated understanding of topics discussed through group discussion and participation, Demonstrated understanding of relationship skills and communication skills and Identified / Expressed personal readiness to incorporate effective communication skills    Treatment Plan:  Patient has a current master individualized treatment plan.  See Epic treatment plan for more information.    Carolina Chen, [facility]

## 2020-12-21 NOTE — GROUP NOTE
"Process Group Note    PATIENT'S NAME: Glen Cardenas  MRN:   [id number]  :   2000  ACCT. NUMBER: [id number]  DATE OF SERVICE: 20  START TIME: 10:00 AM  END TIME: 10:50 AM  FACILITATOR: Carolina Chen LPCC  TOPIC:  Process Group    Diagnoses:  Axis I:  Major depressive disorder, recurrent.  Generalized anxiety disorder.  Cannabis use disorder.   Axis II:  No diagnosis.   Axis III:  History of concussion 2020.       [facility] Adult Partial Hospitalization Program  TRACK: [facility]    NUMBER OF PARTICIPANTS: 8    Telemedicine Visit: The patient's condition can be safely assessed and treated via synchronous audio and visual telemedicine encounter.      Reason for Telemedicine Visit: Services only offered telehealth and due to COVID-19    Originating Site (Patient Location): Patient's home    Distant Site (Provider Location): Provider Remote Setting    Consent:  The patient/guardian has verbally consented to: the potential risks and benefits of telemedicine (video visit) versus in person care; bill my insurance or make self-payment for services provided; and responsibility for payment of non-covered services.     Mode of Communication:  Video Conference via [employer]    As the provider I attest to compliance with applicable laws and regulations related to telemedicine.          Data:    Session content: At the start of this group, patients were invited to check in by identifying themselves, describing their current emotional status, and identifying issues to address in this group.   Area(s) of treatment focus addressed in this session included Symptom Management, Personal Safety and Community Resources/Discharge Planning.    Patient reported feeling \"alright\" today. Patient discussed working toward completing one exam. Patient identified grounding as skills they will use to address their goal(s). Patient reported fatigue may be a barrier to working toward their goal(s) and/or addressing " "mental health symptoms. Patient reported no safety concerns and/or self-injurious behaviors. Patient reported yes substance use. Patient reported they are taking their medications as prescribed. Patient reported feeling proud that they had a \"mellow\" weekend. Patient discussed fatigue he experiences on the computer with the treatment group.     Therapeutic Interventions/Treatment Strategies:  Psychotherapist offered support, feedback and validation and reinforced use of skills. Treatment modalities used include Motivational Interviewing and Cognitive Behavioral Therapy. Interventions include Coping Skills: Assisted patient in identifying 1-2 healthy distraction skills to reduce overall distress, Symptoms Management: Promoted understanding of their diagnoses and how it impacts their functioning and Emotions Management:  Discussed barriers to emotional regulation.    Assessment:    Patient response:   Patient responded to session by accepting feedback, giving feedback, listening, focusing on goals, being attentive and accepting support    Possible barriers to participation / learning include: and no barriers identified    Health Issues:   None reported       Substance Use Review:   Substance Use: Last use: Cannabis this weekend    Mental Status/Behavioral Observations  Appearance:   Appropriate   Eye Contact:   Good   Psychomotor Behavior: Normal   Attitude:   Cooperative   Orientation:   All  Speech   Rate / Production: Normal/ Responsive Normal    Volume:  Normal   Mood:    Anxious  Depressed  Normal  Affect:    Appropriate   Thought Content:   Clear and Safety denies any current safety concerns including suicidal ideation, self-harm, and homicidal ideation  Thought Form:  Coherent  Logical     Insight:    Good     Plan:     Safety Plan: No current safety concerns identified.  Recommended that patient call 911 or go to the local ED should there be a change in any of these risk factors.     Barriers to treatment: " None identified    Patient Contracts (see media tab):  None    Substance Use: Provided encouragement towards sobriety     Continue or Discharge: Patient will continue in Adult Partial Hospitalization Program (PHP)  as planned. Patient is likely to benefit from learning and using skills as they work toward the goals identified in their treatment plan.      Carolina Chen, [facility]  December 21, 2020

## 2020-12-21 NOTE — GROUP NOTE
Psychoeducation Group Note    PATIENT'S NAME: Glen Cardenas  MRN:   [id number]  :   2000  ACCT. NUMBER: [id number]  DATE OF SERVICE: 20  START TIME:  1:00 PM  END TIME:  1:50 PM  FACILITATOR: Ana Walker OTR/L  TOPIC: [facility] OT Group: Lifestyle Balance and Structure  [facility] Adult Partial Hospitalization Program  TRACK: 1    NUMBER OF PARTICIPANTS: 7    Summary of Group / Topics Discussed:  Lifestyle Balance and Structure:  OT Goal-setting & integration: To support progress towards treatment goals and psychiatric recovery, group members were led through a weekly structured process to reflect on progress, integrate new learning/skills, and emerging self-awareness into their daily and weekly life. Provided psychoeducation on the neuroscience of change, self-compassion, and the anatomy of a goal to the group. Facilitated the sharing of individual goals with validation, support, and feedback provided.    Patient Session Goals / Objectives:    Reflected on and create a vision for recovery and wellbeing    Identified and wrote goals for the week    Identified and problem solved barriers to achieving identified goals     Identified plan to support follow through on goals and reflection on progress made        Patient Participation / Response:  Moderately participated, sharing some personal reflections / insights and adequately adequately received / provided feedback with other participants.    Patient presentation: active in group; active in identifying important pieces to his recovery/overall wellness, Verbalized understanding of content and Patient would benefit from additional opportunities to practice the content to be able to generalize it to their everyday life with increased intentionality, consistency, and efficacy in support of their psychiatric recovery    Treatment Plan:  Patient has a current master individualized treatment plan.  See Epic treatment plan for more  information.    Ana Walker, OTR/L

## 2020-12-21 NOTE — GROUP NOTE
Psychoeducation Group Note    PATIENT'S NAME: Glen Cardenas  MRN:   [id number]  :   2000  ACCT. NUMBER: [id number]  DATE OF SERVICE: 20  START TIME:  9:00 AM  END TIME:  9:50 AM  FACILITATOR: Hallie Tierney RN  TOPIC: DARINEL RN Group: Medication Education and Management  [facility] Adult Partial Hospitalization Program  TRACK: 1    NUMBER OF PARTICIPANTS: 8    Summary of Group / Topics Discussed:  Medication Educations and Management:  Medication Jeopardy: Patients provided education regarding medication safety, antidepressants, side effects, neuroleptics, expected medication outcomes, knowledge of diagnosis, symptoms, and symptom management through an engaging jeopardy-style format.     Patient Session Goals / Objectives:    ? Participated in team-based Jeopardy game  ? Identified strategies for safe use, handling, and disposal of medications  ? Discussed basic aspects of medication safety, side effects, adverse outcomes and contraindications  Telemedicine Visit: The patient's condition can be safely assessed and treated via synchronous audio and visual telemedicine encounter.      Reason for Telemedicine Visit: Services only offered telehealth    Originating Site (Patient Location): Patient's home    Distant Site (Provider Location): Provider Remote Setting    Consent:  The patient/guardian has verbally consented to: the potential risks and benefits of telemedicine (video visit) versus in person care; bill my insurance or make self-payment for services provided; and responsibility for payment of non-covered services.     Mode of Communication:  Video Conference via [employer]    As the provider I attest to compliance with applicable laws and regulations related to telemedicine.       Patient Participation / Response:  Fully participated with the group by sharing personal reflections / insights and openly received / provided feedback with other participants.     Demonstrated  understanding of topics discussed through group discussion and participation    Treatment Plan:  Patient has a current master individualized treatment plan.  See Epic treatment plan for more information.    Hallie Tierney RN

## 2020-12-22 ENCOUNTER — HOSPITAL ENCOUNTER (OUTPATIENT)
Dept: BEHAVIORAL HEALTH | Facility: CLINIC | Age: 20
End: 2020-12-22
Attending: PSYCHIATRY & NEUROLOGY
Payer: COMMERCIAL

## 2020-12-22 ENCOUNTER — HOSPITAL ENCOUNTER (OUTPATIENT)
Dept: BEHAVIORAL HEALTH | Facility: CLINIC | Age: 20
Discharge: HOME | End: 2020-12-22
Attending: PSYCHIATRY & NEUROLOGY | Admitting: PSYCHIATRY & NEUROLOGY
Payer: COMMERCIAL

## 2020-12-22 PROCEDURE — H0035 MH PARTIAL HOSP TX UNDER 24H: HCPCS | Mod: GT | Performed by: SOCIAL WORKER

## 2020-12-22 PROCEDURE — H0035 MH PARTIAL HOSP TX UNDER 24H: HCPCS | Mod: 95 | Performed by: COUNSELOR

## 2020-12-22 PROCEDURE — H0035 MH PARTIAL HOSP TX UNDER 24H: HCPCS | Mod: GT

## 2020-12-22 NOTE — GROUP NOTE
Process Group Note    PATIENT'S NAME: Glen Cardenas  MRN:   [id number]  :   2000  ACCT. NUMBER: [id number]  DATE OF SERVICE: 20  START TIME:  1:00 PM  END TIME:  1:50 PM  FACILITATOR: Carolina Chen LPCC  TOPIC:  Process Group    Diagnoses:  Axis I:  Major depressive disorder, recurrent.  Generalized anxiety disorder.  Cannabis use disorder.   Axis II:  No diagnosis.   Axis III:  History of concussion 2020.       [facility] Adult Partial Hospitalization Program  TRACK: [facility]    NUMBER OF PARTICIPANTS: 6    Telemedicine Visit: The patient's condition can be safely assessed and treated via synchronous audio and visual telemedicine encounter.      Reason for Telemedicine Visit: Services only offered telehealth and due to COVID-19    Originating Site (Patient Location): Patient's home    Distant Site (Provider Location): Provider Remote Setting    Consent:  The patient/guardian has verbally consented to: the potential risks and benefits of telemedicine (video visit) versus in person care; bill my insurance or make self-payment for services provided; and responsibility for payment of non-covered services.     Mode of Communication:  Video Conference via [employer]    As the provider I attest to compliance with applicable laws and regulations related to telemedicine.            Data:    Session content: At the start of this group, patients were invited to check in by identifying themselves, describing their current emotional status, and identifying issues to address in this group.   Area(s) of treatment focus addressed in this session included Symptom Management, Personal Safety and Community Resources/Discharge Planning.    Patient reported feeling tired. Patient discussed working toward finishing his final exam. Patient identified taking breaks as skills they will use to address their goal(s). Patient reported fatigue may be a barrier to working toward their goal(s) and/or addressing  mental health symptoms. Patient reported no safety concerns and/or self-injurious behaviors. Patient reported yes substance use. Patient reported they are taking their medications as prescribed. Patient reported feeling proud that they cleaned the house this morning. Patient discussed finishing his final exams with the treatment group.     Therapeutic Interventions/Treatment Strategies:  Psychotherapist offered support, feedback and validation and reinforced use of skills. Treatment modalities used include Motivational Interviewing and Cognitive Behavioral Therapy. Interventions include Coping Skills: Assisted patient in identifying 1-2 healthy distraction skills to reduce overall distress, Symptoms Management: Promoted understanding of their diagnoses and how it impacts their functioning and Emotions Management:  Discussed barriers to emotional regulation.    Assessment:    Patient response:   Patient responded to session by accepting feedback, giving feedback, listening, focusing on goals and being attentive    Possible barriers to participation / learning include: and no barriers identified    Health Issues:   None reported       Substance Use Review:   Substance Use: Last use: Marijuana last night    Mental Status/Behavioral Observations  Appearance:   Appropriate   Eye Contact:   Good   Psychomotor Behavior: Normal   Attitude:   Cooperative   Orientation:   All  Speech   Rate / Production: Normal/ Responsive Normal    Volume:  Normal   Mood:    Anxious  Normal  Affect:    Appropriate   Thought Content:   Clear and Safety denies any current safety concerns including suicidal ideation, self-harm, and homicidal ideation  Thought Form:  Coherent  Logical     Insight:    Good     Plan:     Safety Plan: No current safety concerns identified.  Recommended that patient call 911 or go to the local ED should there be a change in any of these risk factors.     Barriers to treatment: None identified    Patient Contracts  (see media tab):  None    Substance Use: Provided encouragement towards sobriety     Continue or Discharge: Patient will continue in Adult Partial Hospitalization Program (PHP)  as planned. Patient is likely to benefit from learning and using skills as they work toward the goals identified in their treatment plan.      Carolina Chen, [facility]  December 22, 2020

## 2020-12-22 NOTE — GROUP NOTE
Psychotherapy Group Note    PATIENT'S NAME: Glen Cardenas  MRN:   [id number]  :   2000  ACCT. NUMBER: [id number]  DATE OF SERVICE: 20  START TIME:  4:00 PM  END TIME:  5:50 PM  FACILITATOR: Carolina Chen LPCC; Jen Meyer LICSW  TOPIC: MH EBP Group: Specialty Awareness  [facility] Adult Partial Hospitalization Program  TRACK: 1    NUMBER OF PARTICIPANTS: 7    Summary of Group / Topics Discussed:    Education Support Network:  Patients and caregivers received an overview of diagnoses including physical, intellectual, and behavioral indicators of illness. Patients presented information created in the support network development group to engage caregivers in planning for help and support to manage mental health symptoms.  The goal is to help patients and caregivers create a plan for support and assistance after discharge.      Patient Session Goals / Objectives:    Understanding diagnoses and accompanying physical reactions, thoughts, and behaviors.      Explored options to support patients in their recovery     Formulated a plan with caregivers for assistance and support to aid in recovery     Problem solved barriers to follow through on plan    Telemedicine Visit: The patient's condition can be safely assessed and treated via synchronous audio and visual telemedicine encounter.          Reason for Telemedicine Visit: Services only offered telehealth and covid19        Originating Site (Patient Location): Patient's home        Distant Site (Provider Location): Provider Remote Setting        Consent:  The patient/guardian has verbally consented to: the potential risks and benefits of telemedicine (video visit) versus in person care; bill my insurance or make self-payment for services provided; and responsibility for payment of non-covered services.         Mode of Communication:  Video Conference via [employer]        As the provider I attest to compliance with applicable laws  and regulations related to telemedicine.         Patient Participation / Response:  Moderately participated, sharing some personal reflections / insights and adequately adequately received / provided feedback with other participants.    Demonstrated understanding of topics discussed through group discussion and participation    Treatment Plan:  Patient has a current master individualized treatment plan.  See Epic treatment plan for more information.    Jen Meyer, LISSSW

## 2020-12-22 NOTE — PROGRESS NOTES
"[facility] Psychiatric Progress Note  2020      Patient:  Glen Cardenas   Medical Record Number:  [id number]  :  2000    Telemedicine Visit: The patient's condition can be safely assessed and treated via synchronous audio and visual telemedicine encounter.       Reason for Telemedicine Visit: COVID-19 lockdown     Originating Site (Patient Location):  HOME     Distant Site (Provider Location): Remote location     Consent:  The patient/guardian has verbally consented to: the potential risks and benefits of telemedicine (video visit) versus in person care; bill my insurance or make self-payment for services provided; and responsibility for payment of non-covered services          Interim History:   The patient's care was discussed with the treatment team and chart notes were reviewed.  Reports feeling \"generally happy.\"  As his last final tonight in a communication class, feels very good about that, \"50 multiple choice questions in 4 hours, it should be no problem.\"  Tolerating combination of Escitalopram and Abilify, says Abilify added to help sleep but he often forgot to take it and fell asleep anyway.  Uses cannabis sometimes \"later in the evening\", quantity vastly reduced compared to when he lived in [place].    Psychiatric ROS:  Mood:  good  Sleep: Sometimes wakes at 5 AM  Appetite:  normal  Eating:  normal  Energy Level:   good  Concentration/Memory: pretty good  Suicidal Thoughts:No  Homicidal Thoughts:No  Psychotic Symptoms: No  Medication Side Effects:No  Medication Compliance:Yes   Physical Complaints:negative         Medications:     PAST PSYCH MEDS:  Unknown stimulants, Celexa, Lexapro, Abilify    Current Outpatient Medications   Medication Sig     ARIPiprazole (ABILIFY) 2 MG tablet Take 2 mg by mouth daily     calcium carbonate (TUMS) 500 MG chewable tablet Take 1 chew tab by mouth daily     LEXAPRO 20MG Take 20mg daily     ranitidine " (ZANTAC) 150 MG tablet Take 1 tablet (150 mg) by mouth 2 times daily (Patient not taking: Reported on 12/6/2020)     No current facility-administered medications for this encounter.              Allergies:   No Known Allergies         Psychiatric Examination:   There were no vitals taken for this visit.  Weight is 0 lbs 0 oz  There is no height or weight on file to calculate BMI.    Appearance:  awake, alert and adequately groomed  Attitude:  cooperative  Eye Contact:  good  Mood:    good  Affect:  mood congruent, animated, reactive  Speech:  clear, coherent  Psychomotor Behavior:  no evidence of tardive dyskinesia, dystonia, or tics  Throught Process:  logical  Associations:  no loose associations  Thought Content:  no evidence of suicidal ideation or homicidal ideation and no evidence of psychotic thought  Insight:  good  Judgement:  intact  Oriented to:  time, person, and place  Attention Span and Concentration:  intact  Recent and Remote Memory:  intact  Gait:Normal    Risk/Potential for Dangerousness:  Multiple Active Diagnoses:HIGH  Self Care:HIGH  Suicide:LOW  Assault:LOW  Self Injurious Behaviors:LOW  Inappropriate Sexual Behavior:LOW         Labs:   No results found for this or any previous visit (from the past 24 hour(s)).     No results found for this or any previous visit (from the past 1008 hour(s)).      Impression:   This is a 20 year old male continues PHP for mood stabilization.  Mood is improving.           DIagnoses:     Axis I:  Major depressive disorder, recurrent.  Generalized anxiety disorder.  Cannabis use disorder.   Axis II:  No diagnosis.   Axis III:  History of concussion 09/2020.          Plan:     Continue [facility] Program.   Continue  Lexapro and Abilify QAM.  Would consider augmentation with Wellbutrin or switching to an SNRI if the Lexapro is not helpful.     Waqas Moya MD

## 2020-12-22 NOTE — PROGRESS NOTES
Acknowledgement of Current Treatment Plan       I have reviewed my treatment plan with my therapist / counselor on 12/22/20.   I agree with the plan as it is written in the electronic health record.    Name:      Signature:  Glen Cardenas Unable to sign due to COVID-19     Dr. Aj Pendleton MD  Psychiatrist    Jen Meyer, MS, [facility], BC-DMT  Psychotherapist     Carolina Chen MA, [facility]  Psychotherapist  NELSON Love on 12/22/2020 at 10:34 AM]

## 2020-12-22 NOTE — GROUP NOTE
Psychoeducation Group Note    PATIENT'S NAME: Glen Cardenas  MRN:   [id number]  :   2000  ACCT. NUMBER: [id number]  DATE OF SERVICE: 20  START TIME:  3:00 PM  END TIME:  3:50 PM  FACILITATOR: Hallie Tierney RN  TOPIC: DARINEL RN Group: [facility] Adult Partial Hospitalization Program  TRACK: 1    NUMBER OF PARTICIPANTS: 7    Summary of Group / Topics Discussed:  Foundations of Health: Sleep: Case study/sleep hygiene: Patients explored the connection between sleep and mental illness. Patients learned about how adequate sleep can improve health, productivity, wellness, quality of life, and safety.     Patient Session Goals / Objectives:  ? Demonstrated understanding of sleep hygiene practices and benefits of sleep  ? Identified sleep hygiene strategies to utilize     Described the connection between sleep disturbances and mental illness  Telemedicine Visit: The patient's condition can be safely assessed and treated via synchronous audio and visual telemedicine encounter.      Reason for Telemedicine Visit: Services only offered telehealth    Originating Site (Patient Location): Patient's home    Distant Site (Provider Location): Provider Remote Setting    Consent:  The patient/guardian has verbally consented to: the potential risks and benefits of telemedicine (video visit) versus in person care; bill my insurance or make self-payment for services provided; and responsibility for payment of non-covered services.     Mode of Communication:  Video Conference via [employer]    As the provider I attest to compliance with applicable laws and regulations related to telemedicine.       Patient Participation / Response:  Minimally participated, only when prompted / asked.    Demonstrated understanding of topics discussed through group discussion and participation    Treatment Plan:  Patient has a current master individualized treatment plan.  See Epic treatment plan for more  information.    Hallie Tierney RN

## 2020-12-23 ENCOUNTER — HOSPITAL ENCOUNTER (OUTPATIENT)
Dept: BEHAVIORAL HEALTH | Facility: CLINIC | Age: 20
End: 2020-12-23
Attending: PSYCHIATRY & NEUROLOGY
Payer: COMMERCIAL

## 2020-12-23 PROCEDURE — H0035 MH PARTIAL HOSP TX UNDER 24H: HCPCS | Mod: GT

## 2020-12-23 PROCEDURE — H0035 MH PARTIAL HOSP TX UNDER 24H: HCPCS | Mod: 95 | Performed by: COUNSELOR

## 2020-12-23 NOTE — GROUP NOTE
Psychoeducation Group Note    PATIENT'S NAME: Glen Cardenas  MRN:   [id number]  :   2000  ACCT. NUMBER: [id number]  DATE OF SERVICE: 20  START TIME:  9:00 AM  END TIME:  9:50 AM  FACILITATOR: Hallie Tierney RN  TOPIC: MH RN Group: Brain [facility] Adult Partial Hospitalization Program  TRACK: 1    NUMBER OF PARTICIPANTS: 6    Summary of Group / Topics Discussed:  Brain Health:  Pathophysiology of stress and anxiety: Patients were educated on the difference between stress, chronic stress, and anxiety. The anatomy and pathophysiology of the body/brain were reviewed to illustrate the immediate effects of stress/anxiety in the body and the long term effects and increased risks for chronic disease that come from unmanaged stress/anxiety. Self-coping strategies to manage symptoms of stress were reviewed and pharmacologic, psychotherapeutic, and complementary treatment options were discussed.    Patient Session Goals / Objectives:  ? Described the differences between stress and anxiety and how the body responds to it  ? Listed the long term effects and increased risks for chronic disease that can arise from unmanaged stress/anxiety  ? Identified and described pharmacologic, psychotherapeutic, and complementary treatment options  Telemedicine Visit: The patient's condition can be safely assessed and treated via synchronous audio and visual telemedicine encounter.      Reason for Telemedicine Visit: Services only offered telehealth    Originating Site (Patient Location): Patient's home    Distant Site (Provider Location): Provider Remote Setting    Consent:  The patient/guardian has verbally consented to: the potential risks and benefits of telemedicine (video visit) versus in person care; bill my insurance or make self-payment for services provided; and responsibility for payment of non-covered services.     Mode of Communication:  Video Conference via [employer]    As the provider I  attest to compliance with applicable laws and regulations related to telemedicine.       Patient Participation / Response:  Moderately participated, sharing some personal reflections / insights and adequately adequately received / provided feedback with other participants.    Demonstrated understanding of topics discussed through group discussion and participation    Treatment Plan:  Patient has a current master individualized treatment plan.  See Epic treatment plan for more information.    Hallie Tierney RN

## 2020-12-23 NOTE — GROUP NOTE
Process Group Note    PATIENT'S NAME: Glen Cardenas  MRN:   [id number]  :   2000  ACCT. NUMBER: [id number]  DATE OF SERVICE: 20  START TIME: 10:00 AM  END TIME: 10:50 AM  FACILITATOR: Carolina Chen LPCC  TOPIC:  Process Group    Diagnoses:  Axis I:  Major depressive disorder, recurrent.  Generalized anxiety disorder.  Cannabis use disorder.   Axis II:  No diagnosis.   Axis III:  History of concussion 2020.       [facility] Adult Partial Hospitalization Program  TRACK: [facility]    NUMBER OF PARTICIPANTS: 7    Telemedicine Visit: The patient's condition can be safely assessed and treated via synchronous audio and visual telemedicine encounter.      Reason for Telemedicine Visit: Services only offered telehealth and due to COVID-19    Originating Site (Patient Location): Patient's home    Distant Site (Provider Location): Provider Remote Setting    Consent:  The patient/guardian has verbally consented to: the potential risks and benefits of telemedicine (video visit) versus in person care; bill my insurance or make self-payment for services provided; and responsibility for payment of non-covered services.     Mode of Communication:  Video Conference via [employer]    As the provider I attest to compliance with applicable laws and regulations related to telemedicine.            Data:    Session content: At the start of this group, patients were invited to check in by identifying themselves, describing their current emotional status, and identifying issues to address in this group.   Area(s) of treatment focus addressed in this session included Symptom Management, Personal Safety and Community Resources/Discharge Planning.    Patient reported feeling overwhelmed and tired. Patient discussed working toward finishing is final exam today. Patient identified relaxing and making dinner as skills they will use to address their goal(s). Patient reported fatigue may be a barrier to working  toward their goal(s) and/or addressing mental health symptoms. Patient reported no safety concerns and/or self-injurious behaviors. Patient reported no substance use. Patient reported they are taking their medications as prescribed. Patient reported feeling proud that they got up and ready this morning. Patient discussed wanting to take time to engage in self-care after his exam with the treatment group.     Therapeutic Interventions/Treatment Strategies:  Psychotherapist offered support, feedback and validation and reinforced use of skills. Treatment modalities used include Motivational Interviewing and Cognitive Behavioral Therapy. Interventions include Coping Skills: Assisted patient in identifying 1-2 healthy distraction skills to reduce overall distress, Symptoms Management: Promoted understanding of their diagnoses and how it impacts their functioning and Emotions Management:  Discussed barriers to emotional regulation.    Assessment:    Patient response:   Patient responded to session by accepting feedback, giving feedback, listening, focusing on goals, being attentive and accepting support    Possible barriers to participation / learning include: and no barriers identified    Health Issues:   None reported       Substance Use Review:   Substance Use: No active concerns identified.    Mental Status/Behavioral Observations  Appearance:   Appropriate   Eye Contact:   Good   Psychomotor Behavior: Normal   Attitude:   Cooperative   Orientation:   All  Speech   Rate / Production: Normal/ Responsive Normal    Volume:  Normal   Mood:    Anxious  Depressed  Normal  Affect:    Appropriate   Thought Content:   Clear and Safety denies any current safety concerns including suicidal ideation, self-harm, and homicidal ideation  Thought Form:  Coherent  Logical     Insight:    Good     Plan:     Safety Plan: No current safety concerns identified.  Recommended that patient call 911 or go to the local ED should there be a  change in any of these risk factors.     Barriers to treatment: None identified    Patient Contracts (see media tab):  None    Substance Use: Provided encouragement towards sobriety     Continue or Discharge: Patient will continue in Adult Partial Hospitalization Program (PHP)  as planned. Patient is likely to benefit from learning and using skills as they work toward the goals identified in their treatment plan.      Carolina Chen, [facility]  December 23, 2020

## 2020-12-24 ENCOUNTER — HOSPITAL ENCOUNTER (OUTPATIENT)
Dept: BEHAVIORAL HEALTH | Facility: CLINIC | Age: 20
End: 2020-12-24
Attending: PSYCHIATRY & NEUROLOGY
Payer: COMMERCIAL

## 2020-12-24 PROCEDURE — H0035 MH PARTIAL HOSP TX UNDER 24H: HCPCS | Mod: GT | Performed by: COUNSELOR

## 2020-12-24 PROCEDURE — H0035 MH PARTIAL HOSP TX UNDER 24H: HCPCS | Mod: GT

## 2020-12-24 PROCEDURE — H0035 MH PARTIAL HOSP TX UNDER 24H: HCPCS | Mod: 95

## 2020-12-24 PROCEDURE — G0177 OPPS/PHP; TRAIN & EDUC SERV: HCPCS | Mod: GT

## 2020-12-24 ASSESSMENT — ANXIETY QUESTIONNAIRES
4. TROUBLE RELAXING: NEARLY EVERY DAY
IF YOU CHECKED OFF ANY PROBLEMS ON THIS QUESTIONNAIRE, HOW DIFFICULT HAVE THESE PROBLEMS MADE IT FOR YOU TO DO YOUR WORK, TAKE CARE OF THINGS AT HOME, OR GET ALONG WITH OTHER PEOPLE: SOMEWHAT DIFFICULT
GAD7 TOTAL SCORE: 17
6. BECOMING EASILY ANNOYED OR IRRITABLE: NEARLY EVERY DAY
5. BEING SO RESTLESS THAT IT IS HARD TO SIT STILL: NEARLY EVERY DAY
1. FEELING NERVOUS, ANXIOUS, OR ON EDGE: MORE THAN HALF THE DAYS
7. FEELING AFRAID AS IF SOMETHING AWFUL MIGHT HAPPEN: MORE THAN HALF THE DAYS
3. WORRYING TOO MUCH ABOUT DIFFERENT THINGS: MORE THAN HALF THE DAYS
2. NOT BEING ABLE TO STOP OR CONTROL WORRYING: MORE THAN HALF THE DAYS

## 2020-12-24 ASSESSMENT — PATIENT HEALTH QUESTIONNAIRE - PHQ9: SUM OF ALL RESPONSES TO PHQ QUESTIONS 1-9: 15

## 2020-12-24 NOTE — ADDENDUM NOTE
Encounter addended by: Sosa Green RN on: 12/24/2020 5:04 PM   Actions taken: Charge Capture section accepted

## 2020-12-24 NOTE — GROUP NOTE
Psychoeducation Group Note    PATIENT'S NAME: Glen Cardenas  MRN:   [id number]  :   2000  ACCT. NUMBER: [id number]  DATE OF SERVICE: 20  START TIME:  1:00 PM  END TIME:  1:50 PM  FACILITATOR: Sosa Green RN  TOPIC: DARINEL RN Group: Medication Education and Management  [facility] Adult Partial Hospitalization Program  TRACK: 1    NUMBER OF PARTICIPANTS: 7    Telemedicine Visit: The patient's condition can be safely assessed and treated via synchronous audio and visual telemedicine encounter.      Reason for Telemedicine Visit: Services only offered telehealth    Originating Site (Patient Location): Patient's home    Distant Site (Provider Location): Provider Remote Setting    Consent:  The patient/guardian has verbally consented to: the potential risks and benefits of telemedicine (video visit) versus in person care; bill my insurance or make self-payment for services provided; and responsibility for payment of non-covered services.     Mode of Communication:  Video Conference via [employer]    As the provider I attest to compliance with applicable laws and regulations related to telemedicine.    Summary of Group / Topics Discussed:  Medication Educations and Management:  Medication Assessment/Review: In this group, patients were encouraged to discuss questions, concerns, and knowledge of their medications. Various topics were reviewed within the context of a group-based discussion including medication side effects, adverse reactions, contraindications, and expected effects.The risks of substance use was also discussed with particular emphasis on avoiding substance abuse to cope.    Patient Session Goals / Objectives:  ? Assessed patient understanding of their current medications and indication  ? Describe the risks of drinking when taking medications  ? Assessed knowledge of medication side effects and how to manage them      Patient Participation / Response:  Fully participated with  the group by sharing personal reflections / insights and openly received / provided feedback with other participants.     Demonstrated understanding of topics discussed through group discussion and participation, Identified / Expressed personal readiness to practice skills and Verbalized understanding of medication education and management topic    Treatment Plan:  Patient has a current master individualized treatment plan.  See Epic treatment plan for more information.    Sosa Green RN

## 2020-12-24 NOTE — PROGRESS NOTES
Acknowledgement of Current Treatment Plan       I have reviewed my treatment plan with my therapist / counselor on 12/24/20.   I agree with the plan as it is written in the electronic health record.    Name:      Signature:  Glen Cardenas Unable to sign due to COVID-19     Dr. Aj Pendleton MD  Psychiatrist    Jen Meyer, MS, [facility], BC-DMT  Psychotherapist     Carolina Chen MA, [facility]  Psychotherapist  NELSON Love on 12/24/2020 at 7:46 AM

## 2020-12-24 NOTE — GROUP NOTE
Psychoeducation Group Note    PATIENT'S NAME: Glen Cardenas  MRN:   [id number]  :   2000  ACCT. NUMBER: [id number]  DATE OF SERVICE: 20  START TIME: 11:00 AM  END TIME: 11:50 AM  FACILITATOR: Simón Cook OTR/L  TOPIC: [facility] OT Group: Lifestyle Balance and Structure  Telemedicine Visit: The patient's condition can be safely assessed and treated via synchronous audio and visual telemedicine encounter.      Reason for Telemedicine Visit: COVID-19    Originating Site (Patient Location): Patient's home    Distant Site (Provider Location): [facility]: [place] [address]    Consent:  The patient/guardian has verbally consented to: the potential risks and benefits of telemedicine (video visit) versus in person care; bill my insurance or make self-payment for services provided; and responsibility for payment of non-covered services.     Mode of Communication:  Video Conference via [employer]    As the provider I attest to compliance with applicable laws and regulations related to telemedicine.   [facility] Adult Partial Hospitalization Program  TRACK: Group 1    NUMBER OF PARTICIPANTS: 6    Summary of Group / Topics Discussed:  Lifestyle Balance and Structure:  Leisure/Benefits of Focused activity to [place] Stress Provided psychoeducation and discussion on benefits of leisure on stress management, parasympathetic NS activation, and wellness. Facilitated a structured self-reflective process where patients identified their leisure values: what is most important to them with regards to how they spend their time and energy on that promotes lifestyle balance to support mental wellbeing. Experiential process facilitated where patients reflected on past, present, and potential future leisure activities that fulfill their leisure values. Validation and support provided.    Patient Session Goals / Objectives:    [place] the mechanisms and benefits of leisure activity to create lifestyle  balance and improve mental health.     Identified their leisure values (how they want to spend their time and energy)    Identified past, present, and future leisure activities that demonstrate a connection to their leisure values    Identify first step to engaging in a new or old leisure activity again and problem solve barriers.         Patient Participation / Response:  Moderately participated, sharing some personal reflections / insights and adequately adequately received / provided feedback with other participants.    Demonstrated understanding of content through handout/group discussion , Verbalized understanding of content and Patient would benefit from additional opportunities to practice the content to be able to generalize it to their everyday life with increased intentionality, consistency, and efficacy in support of their psychiatric recovery    Treatment Plan:  Patient has a current master individualized treatment plan.  See Epic treatment plan for more information.    Simón Cook, OTR/L

## 2020-12-24 NOTE — GROUP NOTE
Process Group Note    PATIENT'S NAME: Glen Cardenas  MRN:   [id number]  :   2000  ACCT. NUMBER: [id number]  DATE OF SERVICE: 20  START TIME:  9:00 AM  END TIME:  9:50 AM  FACILITATOR: Carolina Chen LPCC  TOPIC:  Process Group    Diagnoses:  Axis I:  Major depressive disorder, recurrent.  Generalized anxiety disorder.  Cannabis use disorder.   Axis II:  No diagnosis.   Axis III:  History of concussion 2020.       [facility] Adult Partial Hospitalization Program  TRACK: [facility]    NUMBER OF PARTICIPANTS: 8    Telemedicine Visit: The patient's condition can be safely assessed and treated via synchronous audio and visual telemedicine encounter.      Reason for Telemedicine Visit: Services only offered telehealth and due to COVID-19    Originating Site (Patient Location): Patient's home    Distant Site (Provider Location): Provider Remote Setting    Consent:  The patient/guardian has verbally consented to: the potential risks and benefits of telemedicine (video visit) versus in person care; bill my insurance or make self-payment for services provided; and responsibility for payment of non-covered services.     Mode of Communication:  Video Conference via [employer]    As the provider I attest to compliance with applicable laws and regulations related to telemedicine.            Data:    Session content: At the start of this group, patients were invited to check in by identifying themselves, describing their current emotional status, and identifying issues to address in this group.   Area(s) of treatment focus addressed in this session included Symptom Management, Personal Safety and Community Resources/Discharge Planning.    Patient reported feeling relieved about finishing his semester of school. Patient discussed working toward relaxing and spending time with family. Patient identified attending to relationships as skills they will use to address their goal(s). Patient reported  holiday stress may be a barrier to working toward their goal(s) and/or addressing mental health symptoms. Patient reported no safety concerns and/or self-injurious behaviors. Patient reported yes substance use. Patient reported they are taking their medications as prescribed. Patient reported feeling proud that they completed their finals. Patient discussed his last day of treatment with the treatment group.     Therapeutic Interventions/Treatment Strategies:  Psychotherapist offered support, feedback and validation and reinforced use of skills. Treatment modalities used include Motivational Interviewing and Cognitive Behavioral Therapy. Interventions include Coping Skills: Assisted patient in identifying 1-2 healthy distraction skills to reduce overall distress, Symptoms Management: Promoted understanding of their diagnoses and how it impacts their functioning and Emotions Management:  Discussed barriers to emotional regulation.    Assessment:    Patient response:   Patient responded to session by accepting feedback, giving feedback, listening, focusing on goals, being attentive and accepting support    Possible barriers to participation / learning include: and no barriers identified    Health Issues:   None reported       Substance Use Review:   Substance Use: Last use: Alcohol last night    Mental Status/Behavioral Observations  Appearance:   Appropriate   Eye Contact:   Good   Psychomotor Behavior: Normal   Attitude:   Cooperative   Orientation:   All  Speech   Rate / Production: Normal/ Responsive Normal    Volume:  Normal   Mood:    Anxious  Depressed  Normal  Affect:    Appropriate   Thought Content:   Clear and Safety denies any current safety concerns including suicidal ideation, self-harm, and homicidal ideation  Thought Form:  Coherent  Logical     Insight:    Good     Plan:     Safety Plan: No current safety concerns identified.  Recommended that patient call 911 or go to the local ED should there be a  change in any of these risk factors.     Barriers to treatment: None identified    Patient Contracts (see media tab):  None    Substance Use: Provided encouragement towards sobriety     Continue or Discharge: Patient is being discharged today. See Treatment Plan and Discharge Summary.       Carolina Chen, [facility]  December 24, 2020

## 2020-12-24 NOTE — GROUP NOTE
Psychotherapy Group Note    PATIENT'S NAME: Glen Cardenas  MRN:   [id number]  :   2000  ACCT. NUMBER: [id number]  DATE OF SERVICE: 20  START TIME: 10:00 AM  END TIME: 10:50 AM  FACILITATOR: Carolina Chen LPCC  TOPIC: MH EBP Group: Coping Skills  [facility] Adult Partial Hospitalization Program  TRACK: [facility]    NUMBER OF PARTICIPANTS: 8    Summary of Group / Topics Discussed:  Coping Skills: Additional Coping Skills:  Patients discussed and practiced processing strategies to address holiday/family stressors.  Reviewed the benefits of applying the aforementioned coping strategies.  Patients explored how these strategies might be applied to daily stressors or distressing situations.    Patient Session Goals / Objectives:    Understand the purpose and benefits of applying in the moment coping strategies    Address barriers to utilizing coping skills when in distress.    Telemedicine Visit: The patient's condition can be safely assessed and treated via synchronous audio and visual telemedicine encounter.      Reason for Telemedicine Visit: Services only offered telehealth and due to COVID-19    Originating Site (Patient Location): Patient's home    Distant Site (Provider Location): Provider Remote Setting    Consent:  The patient/guardian has verbally consented to: the potential risks and benefits of telemedicine (video visit) versus in person care; bill my insurance or make self-payment for services provided; and responsibility for payment of non-covered services.     Mode of Communication:  Video Conference via [employer]    As the provider I attest to compliance with applicable laws and regulations related to telemedicine.          Patient Participation / Response:  Fully participated with the group by sharing personal reflections / insights and openly received / provided feedback with other participants.    Demonstrated understanding of topics discussed through group discussion and  participation, Expressed understanding of the relevance / importance of coping skills at distressing times in life and Demonstrated knowledge of when to consider using a variety of coping skills in daily life    Treatment Plan:  Patient has a current master individualized treatment plan.  See Epic treatment plan for more information.    Carolina Chen, [facility]

## 2020-12-25 ASSESSMENT — ANXIETY QUESTIONNAIRES: GAD7 TOTAL SCORE: 17

## 2021-02-08 ENCOUNTER — RECORDS - HEALTHEAST (OUTPATIENT)
Dept: ADMINISTRATIVE | Facility: OTHER | Age: 21
End: 2021-02-08

## 2021-05-27 VITALS — HEART RATE: 64 BPM | SYSTOLIC BLOOD PRESSURE: 130 MMHG | DIASTOLIC BLOOD PRESSURE: 72 MMHG

## 2021-05-27 VITALS — DIASTOLIC BLOOD PRESSURE: 74 MMHG | SYSTOLIC BLOOD PRESSURE: 138 MMHG | HEART RATE: 64 BPM

## 2021-05-27 ASSESSMENT — PATIENT HEALTH QUESTIONNAIRE - PHQ9: SUM OF ALL RESPONSES TO PHQ QUESTIONS 1-9: 7

## 2021-05-27 NOTE — PROGRESS NOTES
Assessment:       Adverse effect of drug.      Plan:       Titrate Celexa to 30mg for 7 days, then 20mg daily.  Follow-up with PCP in 1-2 weeks to recheck symptoms.  Dicussed signs of worsening symptoms and when to be seen immediately for re-evaluation.       Patient Instructions   Recommend a follow-up with your primary care provider in 1-2 weeks.    Subjective:       Glen Cardenas is a 18 y.o. male here for evaluation of possible medication side effects. Patient has been taking Celexa 40mg daily for about 1 year now for a history of anxiety and depression. He states the medication has been very helpful. For about 2 months now, he has noticed shakiness, feeling restless, and fatigue. He notes looking up side effects of Celexa and that the medication can cause these symptoms. He is wondering if he could have his dose lowered to help with the symptoms. Patient reports that he is not suicidal and has no thoughts of hurting others. He tried to schedule an appointmen with his primary care, but unfortunately he is out of town.    The following portions of the patient's history were reviewed and updated as appropriate: allergies, current medications and problem list.    Review of Systems  Pertinent items are noted in HPI.     Allergies  No Known Allergies      Objective:       /71 (Patient Site: Right Arm, Patient Position: Sitting, Cuff Size: Adult Regular)   Pulse 71   Temp 98.7  F (37.1  C) (Oral)   Resp 16   Wt 168 lb (76.2 kg)   SpO2 96%   BMI 22.63 kg/m    General appearance: alert, appears stated age, cooperative, no distress and non-toxic   Neurologic: Mental status: Alert, oriented, thought content appropriate

## 2021-05-28 NOTE — TELEPHONE ENCOUNTER
RN cannot approve Refill Request    RN can NOT refill this medication med is not covered by policy/route to provider. Last office visit: 3/27/2019 Иван Mo PA-C Last Physical: Visit date not found Last MTM visit: Visit date not found Last visit same specialty: 3/27/2019 Иван Mo PA-C.  Next visit within 3 mo: Visit date not found  Next physical within 3 mo: Visit date not found      Rebeca Quesada, [facility] Triage/Med Refill 4/28/2019    Requested Prescriptions   Pending Prescriptions Disp Refills     citalopram (CELEXA) 10 MG tablet [Pharmacy Med Name: CITALOPRAM 10MG TABLETS] 90 tablet 0     Sig: TAKE 3 TABLETS(30MG) BY MOUTH DAILY FOR 1 WEEK, THEN TAKE 2 TABLETS(20MG) DAILY.       SSRI Refill Protocol  Failed - 4/27/2019 10:09 AM        Failed - Age 21 and younger route to prescribing provider     Last office visit with prescriber/PCP: 3/27/2019 Иван Mo PA-C OR same dept: 3/27/2019 Иван Mo PA-C OR same specialty: 3/27/2019 Иван Mo PA-C  Last physical: Visit date not found Last MTM visit: Visit date not found   Next visit within 3 mo: Visit date not found  Next physical within 3 mo: Visit date not found  Prescriber OR PCP: Иван Mo PA-C  Last diagnosis associated with med order: 1. Adverse effect of drug, initial encounter  - citalopram (CELEXA) 10 MG tablet [Pharmacy Med Name: CITALOPRAM 10MG TABLETS]; TAKE 3 TABLETS(30MG) BY MOUTH DAILY FOR 1 WEEK, THEN TAKE 2 TABLETS(20MG) DAILY.  Dispense: 90 tablet; Refill: 0    If protocol passes may refill for 12 months if within 3 months of last provider visit (or a total of 15 months).

## 2021-05-29 NOTE — TELEPHONE ENCOUNTER
Medication Request  Medication name: Celexa  10 mg. 90 day supply   Pharmacy Name and Location: [facility]   Reason for request: refill  When did you use medication last?:  Daily medication   Patient offered appointment:  no  Okay to leave a detailed message: yes

## 2021-05-29 NOTE — TELEPHONE ENCOUNTER
RN cannot approve Refill Request    RN can NOT refill this medication overdue for office visits and/or labs.    Alexey Mccoy, [facility] Triage/Med Refill 6/12/2019    Requested Prescriptions   Pending Prescriptions Disp Refills     citalopram (CELEXA) 20 MG tablet [Pharmacy Med Name: CITALOPRAM 20MG TABLETS] 30 tablet 0     Sig: TAKE 1 TABLET BY MOUTH DAILY       [facility] Refill Protocol  Failed - 6/11/2019  1:03 PM        Failed - Age 21 and younger route to prescribing provider     Last office visit with prescriber/PCP: 7/31/2018 Edwin Lanier MD OR same dept: 7/31/2018 Edwin Lanier MD OR same specialty: 7/31/2018 Edwin Lanier MD  Last physical: Visit date not found Last MTM visit: Visit date not found   Next visit within 3 mo: Visit date not found  Next physical within 3 mo: Visit date not found  Prescriber OR PCP: Edwni Lanier MD  Last diagnosis associated with med order: There are no diagnoses linked to this encounter.  If protocol passes may refill for 12 months if within 3 months of last provider visit (or a total of 15 months).             Passed - PCP or prescribing provider visit in last year     Last office visit with prescriber/PCP: 7/31/2018 Edwin Lanier MD OR same dept: 7/31/2018 Edwin Lanier MD OR same specialty: 7/31/2018 Edwin Lanier MD  Last physical: Visit date not found Last MTM visit: Visit date not found   Next visit within 3 mo: Visit date not found  Next physical within 3 mo: Visit date not found  Prescriber OR PCP: Edwin Lanier MD  Last diagnosis associated with med order: There are no diagnoses linked to this encounter.  If protocol passes may refill for 12 months if within 3 months of last provider visit (or a total of 15 months).

## 2021-05-29 NOTE — TELEPHONE ENCOUNTER
"Patient Returning Call  Reason for call:  Dad is returning call   Information relayed to patient:  Caller is upset about being told prior no Rx can be given until patient is seen.   Dad is requesting at  least a 7 day Rx. Dad was advise there is no SHOSHANA to discuss this with him and when asked to speak to patient dad responded with \"You do not want to speak with him!\" I explained the HIPPA law and dad stated Maybe if I can get him to calm down we will return the call later today.  Dad also voiced concern on the wellbeing of his son without this medication and the failure of the health care system.   Patient has additional questions:  Yes  If YES, what are your questions/concerns:  Dad then asked to have Edwin Lanier MD return his call under his name stating Dr. Lanier is my doctor as well.   Okay to leave a detailed message?: Yes  "

## 2021-05-29 NOTE — TELEPHONE ENCOUNTER
Attempted to call patient to help schedule but no option for voicemail. No SHOSHANA on file for me to speak with dad. Patient needs to be seen before an adjustment can be made.If patient returns call,please let them know and help patient schedule with Dr. Lanier or Gonzalez Marti.  Lyssa Sierra CMA ............... 10:08 AM, 06/10/19

## 2021-05-29 NOTE — TELEPHONE ENCOUNTER
Patient Returning Call  Reason for call:  Returning clinics call about refill request.   Information relayed to patient:  yes  Patient has additional questions:  Yes  If YES, what are your questions/concerns:  Patient and his father called very upset and demanding that Dr. Johnson call his father to talk to him about this I explained that there is not an SHOSHANA on file per last message. He wants the medication sent to the pharmacy as soon as possible. He argued the fact that the [facility] visit is under the roof as the  Primary clinic and demands to speak to Dr. Lanier. Glen apologizes his voicemail does not work and will wait for the phone call  Okay to leave a detailed message?: Yes

## 2021-05-29 NOTE — TELEPHONE ENCOUNTER
Pts last appt with you about Celexa was 3/30/18 when you increased to 40mg. He never followed up on dosing. Pt [facility] for [facility] and they decreased to 20mg (possible side effects). Pt now requesting 10mg. Should pt come in to discuss before he receives a refill?  PEPE Hamlin  7:48 AM  6/10/2019

## 2021-05-29 NOTE — TELEPHONE ENCOUNTER
RN cannot approve Refill Request    RN can NOT refill this medication med is not covered by policy/route to provider. Last office visit: 7/31/2018 Edwin Lanier MD Last Physical: Visit date not found Last MTM visit: Visit date not found Last visit same specialty: 3/27/2019 Иван Mo PA-C.  Next visit within 3 mo: Visit date not found  Next physical within 3 mo: Visit date not found      Luz Maria Greer, [facility] Triage/Med Refill 6/7/2019    Requested Prescriptions   Pending Prescriptions Disp Refills     citalopram (CELEXA) 10 MG tablet [Pharmacy Med Name: CITALOPRAM 10MG TABLETS] 90 tablet 0     Sig: TAKE 3 TABLETS BY MOUTH DAILY FOR 1 WEEK, THEN TAKE 2 TABLETS BY MOUTH DAILY       [facility] Refill Protocol  Failed - 6/5/2019 12:09 PM        Failed - Age 21 and younger route to prescribing provider     Last office visit with prescriber/PCP: 7/31/2018 Edwin Lanier MD OR same dept: 3/27/2019 Иван Mo PA-C OR same specialty: 3/27/2019 Иван Mo PA-C  Last physical: Visit date not found Last MTM visit: Visit date not found   Next visit within 3 mo: Visit date not found  Next physical within 3 mo: Visit date not found  Prescriber OR PCP: Edwin Lanier MD  Last diagnosis associated with med order: 1. Adverse effect of drug, initial encounter  - citalopram (CELEXA) 10 MG tablet [Pharmacy Med Name: CITALOPRAM 10MG TABLETS]; TAKE 3 TABLETS BY MOUTH DAILY FOR 1 WEEK, THEN TAKE 2 TABLETS BY MOUTH DAILY  Dispense: 90 tablet; Refill: 0    If protocol passes may refill for 12 months if within 3 months of last provider visit (or a total of 15 months).             Passed - PCP or prescribing provider visit in last year     Last office visit with prescriber/PCP: 7/31/2018 Edwin Lanier MD OR same dept: 3/27/2019 Иван Mo PA-C OR same specialty: 3/27/2019 Иван Mo PA-C  Last physical: Visit date not found Last MTM visit: Visit date not found    Next visit within 3 mo: Visit date not found  Next physical within 3 mo: Visit date not found  Prescriber OR PCP: Edwin Lanier MD  Last diagnosis associated with med order: 1. Adverse effect of drug, initial encounter  - citalopram (CELEXA) 10 MG tablet [Pharmacy Med Name: CITALOPRAM 10MG TABLETS]; TAKE 3 TABLETS BY MOUTH DAILY FOR 1 WEEK, THEN TAKE 2 TABLETS BY MOUTH DAILY  Dispense: 90 tablet; Refill: 0    If protocol passes may refill for 12 months if within 3 months of last provider visit (or a total of 15 months).

## 2021-05-30 NOTE — PROGRESS NOTES
ASSESSMENT and PLAN:    #1.  Anxiety, controlled.  Continue Celexa 20 mg/day.  Follow-up 1 year.    Problem List Items Addressed This Visit     Anxiety - Primary    Relevant Medications    citalopram (CELEXA) 20 MG tablet          There are no Patient Instructions on file for this visit.    Medications Discontinued During This Encounter   Medication Reason     citalopram (CELEXA) 20 MG tablet Reorder       Return in about 1 year (around 6/27/2020) for Medication Check.    CHIEF COMPLAINT:  Chief Complaint   Patient presents with     Medication Refill       HISTORY OF PRESENT ILLNESS:  Glen Cardenas is a 18 y.o. male  presenting to the clinic today for follow-up of his anxiety.  He is currently on 20 mg of Celexa and is tolerating this well.  He enjoyed his first year of school at the [employer] and is going back in mid August.  He denies any side effects from the medication.  He states that his relationship with his father is improving.    REVIEW OF SYSTEMS:   Pertinent positives noted in HPI, remainder of ROS is negative.    PFSH:  noted    MEDICATIONS:  Current Outpatient Medications   Medication Sig Dispense Refill     citalopram (CELEXA) 20 MG tablet Take 1 tablet (20 mg total) by mouth daily. 90 tablet 3     No current facility-administered medications for this visit.        TOBACCO USE:  Social History     Tobacco Use   Smoking Status Current Some Day Smoker     Types: Cigarettes   Smokeless Tobacco Never Used       VITALS:  Vitals:    06/27/19 1236   BP: 118/58   Pulse: 80   Weight: 165 lb 3.2 oz (74.9 kg)     Wt Readings from Last 3 Encounters:   06/27/19 165 lb 3.2 oz (74.9 kg)   03/27/19 168 lb (76.2 kg)   07/31/18 150 lb (68 kg) (42 %, Z= -0.20)*     * Growth percentiles are based on CDC (Boys, 2-20 Years) data.         PHYSICAL EXAM:  Constitutional:  Reveals an alert, pleasant  male.   Vitals:  Per nursing notes.   Body mass index is 22.25 kg/m .    Neurologic: Normal

## 2021-05-31 VITALS — WEIGHT: 156 LBS | HEIGHT: 72 IN | BODY MASS INDEX: 21.13 KG/M2

## 2021-06-01 VITALS — BODY MASS INDEX: 20.34 KG/M2 | WEIGHT: 150 LBS

## 2021-06-01 VITALS — WEIGHT: 153 LBS | BODY MASS INDEX: 20.75 KG/M2

## 2021-06-02 VITALS — BODY MASS INDEX: 22.78 KG/M2 | WEIGHT: 168 LBS

## 2021-06-03 VITALS — WEIGHT: 165.2 LBS | BODY MASS INDEX: 22.41 KG/M2

## 2021-06-04 VITALS
SYSTOLIC BLOOD PRESSURE: 118 MMHG | HEART RATE: 76 BPM | WEIGHT: 159 LBS | DIASTOLIC BLOOD PRESSURE: 60 MMHG | BODY MASS INDEX: 21.54 KG/M2 | HEIGHT: 72 IN

## 2021-06-05 VITALS
WEIGHT: 178.3 LBS | DIASTOLIC BLOOD PRESSURE: 68 MMHG | SYSTOLIC BLOOD PRESSURE: 110 MMHG | HEART RATE: 86 BPM | OXYGEN SATURATION: 98 % | BODY MASS INDEX: 24.01 KG/M2

## 2021-06-06 NOTE — PROGRESS NOTES
Assessment:      Healthy male exam.      Anxiety, well controlled.    Mildly symptomatic varicocele on the left.     Plan:      No labs needed today.  As far as his varicocele is concerned I told him that if this were to bother him further we would likely send him to urology for an evaluation and potentially surgical treatment of this.  He states that he is going to let me know if this becomes more of an issue for him.  Otherwise follow-up 1 year, earlier if needed.    Subjective:      Glen Cardenas is a 19 y.o. male who presents for an annual exam.  He is currently back at home and is going to attend the [employer] next year.  He unfortunately had to dropout of the [employer] as it ended up not being a good fit for him.  He is feeling well.  He states that he will get occasional pain in the left testicle where he has a known varicocele.  Anxiety is under good control on 20 mg of citalopram.    Immunization History   Administered Date(s) Administered     Dtap 01/05/2004     Hepatitis A, Ped/Adol 2 Dose IM (18yr & under) 09/22/2009     IPV 01/05/2004     MMR 01/05/2004     Meningococcal MCV4O 07/14/2015     Meningococcal MCV4P 12/10/2010     Tdap 12/10/2010     Immunization status: up to date and documented.    No exam data present    Current Outpatient Medications   Medication Sig Dispense Refill     citalopram (CELEXA) 20 MG tablet Take 1 tablet (20 mg total) by mouth daily. 90 tablet 3     No current facility-administered medications for this visit.      History reviewed. No pertinent past medical history.  History reviewed. No pertinent surgical history.  Patient has no known allergies.  No family history on file.  Social History     Socioeconomic History     Marital status: Single     Spouse name: Not on file     Number of children: Not on file     Years of education: Not on file     Highest education level: Not on file   Occupational History     Not on file   Social Needs      "Financial resource strain: Not on file     Food insecurity:     Worry: Not on file     Inability: Not on file     Transportation needs:     Medical: Not on file     Non-medical: Not on file   Tobacco Use     Smoking status: Former Smoker     Types: Cigarettes     Smokeless tobacco: Never Used     Tobacco comment: smokes e-cig   Substance and Sexual Activity     Alcohol use: Not on file     Drug use: Yes     Types: Marijuana     Sexual activity: Not on file   Lifestyle     Physical activity:     Days per week: Not on file     Minutes per session: Not on file     Stress: Not on file   Relationships     Social connections:     Talks on phone: Not on file     Gets together: Not on file     Attends Jewish service: Not on file     Active member of club or organization: Not on file     Attends meetings of clubs or organizations: Not on file     Relationship status: Not on file     Intimate partner violence:     Fear of current or ex partner: Not on file     Emotionally abused: Not on file     Physically abused: Not on file     Forced sexual activity: Not on file   Other Topics Concern     Not on file   Social History Narrative     Not on file       Review of Systems  General:  Denies problem  Eyes: Denies problem  Ears/Nose/Throat: Denies problem  Cardiovascular: Denies problem  Respiratory:  Denies problem  Gastrointestinal:  Denies problem  Genitourinary: Denies problem  Musculoskeletal:  Denies problem  Skin: Denies problem  Neurologic: Denies problem  Psychiatric: Denies problem  Endocrine: Denies problem  Heme/Lymphatic: Denies problem   Allergic/Immunologic: Denies problem        Objective:     Vitals:    03/02/20 1331   BP: 118/60   Pulse: 76   Weight: 159 lb (72.1 kg)   Height: 6' 0.25\" (1.835 m)     Body mass index is 21.42 kg/m .    Physical  General Appearance: Alert, cooperative, no distress, appears stated age  Head: Normocephalic, without obvious abnormality, atraumatic  Eyes: PERRL, conjunctiva/corneas " clear, EOM's intact  Ears: Normal TM's and external ear canals, both ears  Nose: Nares normal, septum midline,mucosa normal, no drainage  Throat: Lips, mucosa, and tongue normal; teeth and gums normal  Neck: Supple, symmetrical, trachea midline, no adenopathy;  thyroid: not enlarged, symmetric, no tenderness/mass/nodules; no carotid bruit or JVD  Back: Symmetric, no curvature, ROM normal, no CVA tenderness  Lungs: Clear to auscultation bilaterally, respirations unlabored  Heart: Regular rate and rhythm, S1 and S2 normal, no murmur, rub, or gallop,  Abdomen: Soft, non-tender, bowel sounds active all four quadrants,  no masses, no organomegaly  Genitourinary: Penis normal. Right testis is descended. Left testis is descended.  There is a sizable varicocele present on the left.  Musculoskeletal: Normal range of motion. No joint swelling or deformity.   Extremities: Extremities normal, atraumatic, no cyanosis or edema  Skin: Skin color, texture, turgor normal, no rashes or lesions  Lymph nodes: Cervical, supraclavicular, and axillary nodes normal  Neurologic: He is alert. He has normal reflexes.   Psychiatric: He has a normal mood and affect.

## 2021-06-10 NOTE — TELEPHONE ENCOUNTER
RN cannot approve Refill Request    RN can NOT refill this medication med is not covered by policy/route to provider. Last office visit: 6/27/2019 Edwin Lanier MD Last Physical: 3/2/2020 Last MTM visit: Visit date not found Last visit same specialty: 6/27/2019 Edwin Lanier MD.  Next visit within 3 mo: Visit date not found  Next physical within 3 mo: Visit date not found      Rebeca Quesada, [facility] Triage/Med Refill 7/26/2020    Requested Prescriptions   Pending Prescriptions Disp Refills     citalopram (CELEXA) 20 MG tablet [Pharmacy Med Name: CITALOPRAM 20MG TABLETS] 90 tablet 3     Sig: TAKE 1 TABLET BY MOUTH EVERY DAY       [facility] Refill Protocol  Failed - 7/26/2020  4:04 AM        Failed - Age 21 and younger route to prescribing provider     Last office visit with prescriber/PCP: 6/27/2019 Edwin Lanier MD OR same dept: Visit date not found OR same specialty: 6/27/2019 Edwin Lanier MD  Last physical: 3/2/2020 Last MTM visit: Visit date not found   Next visit within 3 mo: Visit date not found  Next physical within 3 mo: Visit date not found  Prescriber OR PCP: Edwin Lanier MD  Last diagnosis associated with med order: 1. Anxiety  - citalopram (CELEXA) 20 MG tablet [Pharmacy Med Name: CITALOPRAM 20MG TABLETS]; TAKE 1 TABLET BY MOUTH EVERY DAY  Dispense: 90 tablet; Refill: 3    If protocol passes may refill for 12 months if within 3 months of last provider visit (or a total of 15 months).             Passed - PCP or prescribing provider visit in last year     Last office visit with prescriber/PCP: 6/27/2019 Edwin Lanier MD OR same dept: Visit date not found OR same specialty: 6/27/2019 Edwin Lanier MD  Last physical: 3/2/2020 Last MTM visit: Visit date not found   Next visit within 3 mo: Visit date not found  Next physical within 3 mo: Visit date not found  Prescriber OR PCP: Edwin Lanier MD  Last diagnosis associated with med  order: 1. Anxiety  - citalopram (CELEXA) 20 MG tablet [Pharmacy Med Name: CITALOPRAM 20MG TABLETS]; TAKE 1 TABLET BY MOUTH EVERY DAY  Dispense: 90 tablet; Refill: 3    If protocol passes may refill for 12 months if within 3 months of last provider visit (or a total of 15 months).

## 2021-06-12 NOTE — PROGRESS NOTES
"Glen Cardenas is a 19 y.o. male who is being evaluated via a billable telephone visit.      The patient has been notified of following:     \"This telephone visit will be conducted via a call between you and your physician/provider. We have found that certain health care needs can be provided without the need for a physical exam.  This service lets us provide the care you need with a short phone conversation.  If a prescription is necessary we can send it directly to your pharmacy.  If lab work is needed we can place an order for that and you can then stop by our lab to have the test done at a later time.    Telephone visits are billed at different rates depending on your insurance coverage. During this emergency period, for some insurers they may be billed the same as an in-person visit.  Please reach out to your insurance provider with any questions.    If during the course of the call the physician/provider feels a telephone visit is not appropriate, you will not be charged for this service.\"    Patient has given verbal consent to a Telephone visit? Yes    What phone number would you like to be contacted at? [phone]      Additional provider notes: Serafin called in today in  regards to obvious concussion.  Unfortunately he has not been doing a lot of relative brain rest, as he had to do a lot of work for school and thus has been using his laptop a lot.  On he says that he has been trying to limit his phone use as much as possible, however.  He states that he still has essentially daily headaches and has some light and sound sensitivity, but his nausea has improved.  He still has some emotional lability as well.  He states that school is going much easier for him after this week and he said that he is likely going to be able to get more brain rest and.  I am going to see him back in the office in 2 weeks to reevaluate him in person.  We will be able to reexamine him at that point as well.  Further " recommendations will be made after this.      Phone call duration:  9 minutes    Ivy Carl, CMA

## 2021-06-12 NOTE — PROGRESS NOTES
"ASSESSMENT and PLAN:    1) Concussion due to self inflicted head trauma.  We discussed the underlying pathophysiology of concussion.  I recommended relative brain rest, specifically limiting the amount of texting, phone use, and TV watching that he is doing.  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  I would be happy to write a letter for this if he needs it.  We also discussed his drug use and its negative effects on his health, both short-term with concussion healing and in the long-term as well.  I explained that regular marijuana use can worsen anxiety and paranoia which he understands.  I will plan following up with him in 2 weeks for reevaluation.    30 minutes were spent with the patient, over half of which was in counseling and coordination of care.    Problem List Items Addressed This Visit     None      Visit Diagnoses     Concussion without loss of consciousness, initial encounter    -  Primary    Need for immunization against influenza              There are no Patient Instructions on file for this visit.    There are no discontinued medications.    No follow-ups on file.    CHIEF COMPLAINT:  Chief Complaint   Patient presents with     Annual Exam     Headache -Sleep issues, nausea - hit himself in head multiple times x 3 weeks       HISTORY OF PRESENT ILLNESS:  Glen Cardenas is a 19 y.o. male  presenting to the clinic today for evaluation of a possible concussion.  Serafin states that he hit himself in the head multiple times about two weeks ago in a moment of frustration and anger at his roommates after they had an argument about household responsibilities.  Serafin admits to using marijuana on a daily basis and also has used cocaine in the last 2-3 weeks.  He knows that his self harm was \"the wrong thing to do\" and \"I wasn't myself when I did it\".  Nonetheless, since then he states that he " has been having frequent headaches along with some difficulty sleeping.  He states that the headaches get worse when he is on his phone and when he is reading.  He states that he gets phonophobia and some mild photophobia as well.  He has felt nauseous and has thrown up once over the last couple of weeks.  He states that his anxiety has been worse recently as well.  No double vision.  He states that he does have a history of a concussion in approximately fifth grade when he was hit in the head with a baseball.  This was not formally diagnosed, however.  Mild dizziness as well.    REVIEW OF SYSTEMS:   Pertinent positives noted in HPI, remainder of ROS is negative.    MEDICATIONS:  Current Outpatient Medications   Medication Sig Dispense Refill     citalopram (CELEXA) 20 MG tablet TAKE 1 TABLET BY MOUTH EVERY DAY 90 tablet 3     No current facility-administered medications for this visit.        TOBACCO USE:  Social History     Tobacco Use   Smoking Status Former Smoker     Types: Cigarettes   Smokeless Tobacco Never Used   Tobacco Comment    smokes e-cig       VITALS:  Vitals:    10/20/20 1437   BP: 110/68   Patient Site: Right Arm   Patient Position: Sitting   Cuff Size: Adult Large   Pulse: 86   SpO2: 98%   Weight: 178 lb 4.8 oz (80.9 kg)     Wt Readings from Last 3 Encounters:   10/20/20 178 lb 4.8 oz (80.9 kg)   03/02/20 159 lb (72.1 kg)   06/27/19 165 lb 3.2 oz (74.9 kg)         PHYSICAL EXAM:  Constitutional:  Reveals an alert, pleasant male.   HEET: Normocephalic, without obvious abnormality, atraumatic.   Neurologic: Normal gait and station.  No problems with months of the year backward.  He can hold a heel-to-toe stance with his hands on his hips and eyes closed for only 5 seconds or so before breaking his stance, however.  Psychologic: Normal affect

## 2021-06-12 NOTE — TELEPHONE ENCOUNTER
New Appointment Needed  What is the reason for the visit:    Concerned patient has concussion as he hit himself in the head many times, ever since then he's been hacing stomach aches and headaches.  Provider Preference: PCP only  How soon do you need to be seen?: ASAP  Waitlist offered?: No  Okay to leave a detailed message:  Yes

## 2021-06-12 NOTE — TELEPHONE ENCOUNTER
Called mom back and told her our schedule is full so we can't see him today. If she is having emergent concerts she can bring Shadi to [facility] or HARINDER Hamlin CMA  9:38 AM ........ 10/19/2020

## 2021-06-12 NOTE — TELEPHONE ENCOUNTER
Who is calling:  Patient's mom  Reason for Call:  Patient is really struggling/patient has been hitting himself in the head/and mom is worried about him having a concussion/patient is having terrible headache on and off. Please contact mom for next route of care/mom is on consent to communicate. Mom would like to get him in today rather then tomorrow  Date of last appointment with primary care: na  Okay to leave a detailed message: Yes

## 2021-06-13 NOTE — TELEPHONE ENCOUNTER
Pt's father is calling. No consent on file.  He is not with him. He is with his mom now.  I advised him that I would need to speak to mom as she is the only one on the consent. He stated that he just wants to get a message to Dr Lanier.    Antidepressant Celexa was increased to 40 mg on 11/18/2020. He is doing worse. Out of control. Dad feels like he is unstable. He is crawling out of his skin. Talks about suicide but does not have a plan. He is home with mom now and is not alone.  Dad stated that he would like him to be admitted to the mental health [facility] at [place]. He spoke to the intake over there, and they stated that he would have to go into the ED. Be evaluated and then be admitted to the ED for 2-3 days before a bed would be available for him on the mental health [facility]. Dad stated that that is a horrible process, and he doesn't want him sitting without any family members, in the ED waiting for a bed. He was wondering if you had any advice or other options available? Dad would like a call back tomorrow AM.  Dad stated that he would take him to the ED tonight if his symptoms worsened.     Reason for Disposition    [1] Follow-up call from patient regarding patient's clinical status AND [2] information NON-URGENT    Additional Information    Negative: Lab calling with strep throat test results and triager can call in prescription    Negative: Lab calling with urinalysis test results and triager can call in prescription    Negative: Medication questions    Negative: ED call to PCP    Negative: Physician call to PCP    Negative: Call about patient who is currently hospitalized    Negative: Lab or radiology calling with CRITICAL test results    Negative: [1] Prescription not at pharmacy AND [2] was prescribed today by PCP    Negative: [1] Follow-up call from patient regarding patient's clinical status AND [2] information urgent    Negative: [1] Caller requests to speak ONLY to PCP AND [2] URGENT question     Negative: [1] Caller requests to speak to PCP now AND [2] won't tell us reason for call  (Exception: if 10 pm to 6 am, caller must first discuss reason for the call)    Negative: Notification of hospital admission    Negative: Notification of death    Negative: Caller requesting lab results    Negative: Lab or radiology calling with test results    Protocols used: PCP CALL - NO TRIAGE-A-    Shanna Shaw RN   [facility] Nurse Advisor  12/06/20 at 4:28 PM

## 2021-06-13 NOTE — PROGRESS NOTES
ASSESSMENT and PLAN:    Concussion, improving, and depression which has been getting worse.  I am going to increase his Celexa to 40 mg/day.  He is going to follow-up in 3 to 4 weeks for reevaluation.  Continue limiting his computer time until we reconvene again.    Problem List Items Addressed This Visit     Depression with anxiety          There are no Patient Instructions on file for this visit.    There are no discontinued medications.    No follow-ups on file.    CHIEF COMPLAINT:  Chief Complaint   Patient presents with     Follow-up       HISTORY OF PRESENT ILLNESS:  Glen Cardenas is a 19 y.o. male  presenting to the clinic today for follow-up of a concussion along with his depression.  He states that his headaches have been improving and his light sensitivity has been improving as well.  Nausea is almost completely gone at this point.  He has been trying to limit his computer time and has been fairly successful at this.  He states that his depression has been worse over the last couple of weeks, however.  He is taking 20 mg of Celexa on a regular basis.  He states that he sleeps a lot and has difficulty concentrating on things.  He also admits to some passive suicidal ideation, but does not have a plan to go through with it and does not feel that he could ever do this.  He has a very strong history of mental illness in his family, including anxiety, depression, and bipolar disorder.    REVIEW OF SYSTEMS:   Pertinent positives noted in HPI, remainder of ROS is negative.    MEDICATIONS:  Current Outpatient Medications   Medication Sig Dispense Refill     citalopram (CELEXA) 20 MG tablet TAKE 1 TABLET BY MOUTH EVERY DAY 90 tablet 3     No current facility-administered medications for this visit.        TOBACCO USE:  Social History     Tobacco Use   Smoking Status Former Smoker     Types: Cigarettes   Smokeless Tobacco Never Used   Tobacco Comment    smokes e-cig       VITALS:  Vitals:    11/17/20 1044    BP: 138/74   Pulse: 64     Wt Readings from Last 3 Encounters:   10/20/20 178 lb 4.8 oz (80.9 kg)   03/02/20 159 lb (72.1 kg)   06/27/19 165 lb 3.2 oz (74.9 kg)         PHYSICAL EXAM:  Constitutional:  Reveals an alert, pleasant male.   HEET: Normocephalic, without obvious abnormality, atraumatic.   Neurologic: Normal gait and station  Psychologic: Normal affect  Able to do months of the year backwards.  Able to hold heel-to-toe standing with his hand on his hips and with his eyes closed.

## 2021-06-13 NOTE — PROGRESS NOTES
"ASSESSMENT and PLAN:    Depression, now improved on a new medication regimen.  I'm happy to see Serafin doing better from a mental health perspective.  He will continue with the partial hospitalization program and continue on his medications.  He states that his headaches have been improving and he is sleeping better as well.  He will follow-up with us as needed.    Problem List Items Addressed This Visit     None          There are no Patient Instructions on file for this visit.    Medications Discontinued During This Encounter   Medication Reason     citalopram (CELEXA) 20 MG tablet        No follow-ups on file.    CHIEF COMPLAINT:  Chief Complaint   Patient presents with     Follow-up     mental health - med changes - states he is doing much better after getting some help       HISTORY OF PRESENT ILLNESS:  Glen Cardenas is a 20 y.o. male  presenting to the clinic today for follow-up of his depression.  He unfortunately had a mental health crisis about a week ago which necessitated an ER visit at [facility].  He was having worsening symptoms of depression, including anhedonia and passive suicidal ideation and his father became worried and thus brought him to the emergency room.  He was set up with the partial hospitalization program at [place] and was also set up with a mental health nurse practitioner in [place].  His Celexa was discontinued and he was started on both Lexapro and Abilify.  He is tolerating both of these well although admits to missing a couple of doses of his Abilify.  He just started the partial hospitalization program today, and states that he is feeling much better as this has given him some perspective on his depression, stating that \"when I see people who are doing worse than I am, it makes me think I can get through this\".  No side effects from his new medication that he has noticed.      REVIEW OF SYSTEMS:   Pertinent positives noted in HPI, remainder of ROS is " negative.    MEDICATIONS:  Current Outpatient Medications   Medication Sig Dispense Refill     ARIPiprazole (ABILIFY) 2 MG tablet Take 2 mg by mouth daily.       escitalopram oxalate (LEXAPRO) 20 MG tablet Take 20 mg by mouth daily.       No current facility-administered medications for this visit.        TOBACCO USE:  Social History     Tobacco Use   Smoking Status Former Smoker     Types: Cigarettes   Smokeless Tobacco Never Used   Tobacco Comment    smokes e-cig       VITALS:  Vitals:    12/14/20 1003   BP: 130/72   Pulse: 64     Wt Readings from Last 3 Encounters:   10/20/20 178 lb 4.8 oz (80.9 kg)   03/02/20 159 lb (72.1 kg)   06/27/19 165 lb 3.2 oz (74.9 kg)         PHYSICAL EXAM:  Constitutional:  Reveals an alert, pleasant male.   HEET: Normocephalic, without obvious abnormality, atraumatic.   Neurologic: Normal gait and station  Psychologic: Normal affect

## 2021-06-15 NOTE — PROGRESS NOTES
"Serafin comes in today to establish care.  Please see the physical exam forms a and B for further details, including a complete review of systems.    ILLNESSES, HOSPITALIZATIONS, AND OPERATIONS:    #1.  None.    Serafin states that he has been having some abdominal pain along with some intermittent constipation and loose stools for the last 2 months.  He is at the visit with his father, and states that his symptoms have not been getting better nor worse.  He denies any blood in his stool or black tarry stools.  He denies any nausea or vomiting or fevers.  He feels that his food \"does not digest well\".  He generally will have a bowel movement every day, however.    I asked his father to leave the room, as I was told initially that he wanted to discuss depression and anxiety.  I asked him if he had any of the symptoms and he responded in the affirmative.  He states that he has been had dealing with anxiety for at least the last year, and he states that he will get very edgy and temperamental.  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  He also states that he has a somewhat lisset relationship with both his mother and his father's girlfriend.  He lived with his mother for a while about a year ago, but then was kicked out and lived with his father and his father's girlfriend.  This was not a good living situation for him, and eventually he moved back to living with his mother is currently living with her still.  He states that he is not sleeping well and tends to overeat at times.  Energy level is poor.  He has only very mild passive suicidal ideation, but has never had a plan in place.  He is currently seeing a therapist in [place] and has a good working relationship with him.  He has never been on medication for anxiety but it is interested in doing so.  He smokes marijuana on a " weekly basis and drinks alcohol about every other week.  No other drug use.    Past medical and surgical history reviewed.  He has no known drug allergies and is currently not on any medications.  Social history as above.    Objective: Vital signs are as per the EMR.  In general the patient is alert pleasant and in no acute distress.  He appears healthy.    Abdomen is soft nontender nondistended.    Assessment and plan: Anxiety along with some various abdominal symptoms, which I believe are caused by the anxiety.  We discussed medication today and I am going to get him started on Celexa 20 mg per day.  Side effects were reviewed.  He will follow-up in 1 month for reevaluation.  If things were to worsen in the meantime he should call back right away.    30 minutes were spent with the patient, over half of which was in counseling coordination of care.

## 2021-06-19 NOTE — PROGRESS NOTES
Serafin comes in today for evaluation of sinus congestion.  He states that this is been going on for about the last 2-3 days.  He localizes some discomfort behind his eyes along with just above them as well.  He has been blowing his nose a lot and has been clearing his throat as well.  He has been having a dry cough but denies any wheezing.  No fevers.  He has been having some nausea along with a sore throat as well.    Objective: Vital signs are as per the EMR.  In general the patient is alert pleasant and in no acute distress.  He appears healthy.    HEENT: Oropharynx shows left tonsillar enlargement with no exudates.  Tender to percussion over the left maxillary sinus.  No anterior posterior cervical lymphadenopathy.    Cardiovascular: S1-S2 regular rate and rhythm no murmurs gallops rubs    Lungs are clear.    Assessment and plan: Viral sinusitis.  I advised him to use both Flonase and Allegra over-the-counter.  If he is not improved in a few days we could consider antibiotics at that time.

## 2021-07-25 ENCOUNTER — HEALTH MAINTENANCE LETTER (OUTPATIENT)
Age: 21
End: 2021-07-25

## 2021-09-19 ENCOUNTER — HEALTH MAINTENANCE LETTER (OUTPATIENT)
Age: 21
End: 2021-09-19

## 2022-08-20 ENCOUNTER — HEALTH MAINTENANCE LETTER (OUTPATIENT)
Age: 22
End: 2022-08-20

## 2022-11-20 ENCOUNTER — HEALTH MAINTENANCE LETTER (OUTPATIENT)
Age: 22
End: 2022-11-20

## 2023-09-16 ENCOUNTER — HEALTH MAINTENANCE LETTER (OUTPATIENT)
Age: 23
End: 2023-09-16

## 2023-09-24 ENCOUNTER — HOSPITAL ENCOUNTER (EMERGENCY)
Facility: CLINIC | Age: 23
Discharge: HOME | End: 2023-09-24
Attending: EMERGENCY MEDICINE | Admitting: EMERGENCY MEDICINE
Payer: COMMERCIAL

## 2023-09-24 VITALS
DIASTOLIC BLOOD PRESSURE: 60 MMHG | TEMPERATURE: 97.7 F | WEIGHT: 197.56 LBS | SYSTOLIC BLOOD PRESSURE: 125 MMHG | HEIGHT: 75 IN | OXYGEN SATURATION: 98 % | RESPIRATION RATE: 16 BRPM | BODY MASS INDEX: 24.56 KG/M2 | HEART RATE: 76 BPM

## 2023-09-24 DIAGNOSIS — F33.1 MAJOR DEPRESSIVE DISORDER, RECURRENT EPISODE, MODERATE WITH ANXIOUS DISTRESS (H): ICD-10-CM

## 2023-09-24 DIAGNOSIS — R45.851 SUICIDAL IDEATION: ICD-10-CM

## 2023-09-24 DIAGNOSIS — F19.10 POLYSUBSTANCE ABUSE (H): ICD-10-CM

## 2023-09-24 PROBLEM — F10.10 ALCOHOL ABUSE, EPISODIC DRINKING BEHAVIOR: Status: ACTIVE | Noted: 2023-09-24

## 2023-09-24 PROBLEM — F41.1 GENERALIZED ANXIETY DISORDER: Status: ACTIVE | Noted: 2023-09-24

## 2023-09-24 PROBLEM — F12.10 CANNABIS ABUSE: Status: ACTIVE | Noted: 2023-09-24

## 2023-09-24 PROBLEM — F14.10 COCAINE ABUSE (H): Status: ACTIVE | Noted: 2023-09-24

## 2023-09-24 PROCEDURE — 99244 OFF/OP CNSLTJ NEW/EST MOD 40: CPT

## 2023-09-24 PROCEDURE — 99283 EMERGENCY DEPT VISIT LOW MDM: CPT

## 2023-09-24 PROCEDURE — 250N000013 HC RX MED GY IP 250 OP 250 PS 637

## 2023-09-24 RX ORDER — ESCITALOPRAM OXALATE 20 MG/1
20 TABLET ORAL DAILY
COMMUNITY

## 2023-09-24 RX ORDER — ARIPIPRAZOLE 5 MG/1
5 TABLET ORAL DAILY
Qty: 14 TABLET | Refills: 0 | Status: SHIPPED | OUTPATIENT
Start: 2023-09-24

## 2023-09-24 RX ADMIN — NICOTINE POLACRILEX 2 MG: 2 GUM, CHEWING BUCCAL at 12:42

## 2023-09-24 ASSESSMENT — COLUMBIA-SUICIDE SEVERITY RATING SCALE - C-SSRS
TOTAL  NUMBER OF INTERRUPTED ATTEMPTS LIFETIME: NO
ATTEMPT LIFETIME: NO
2. HAVE YOU ACTUALLY HAD ANY THOUGHTS OF KILLING YOURSELF?: NO
2. HAVE YOU ACTUALLY HAD ANY THOUGHTS OF KILLING YOURSELF?: YES
1. HAVE YOU WISHED YOU WERE DEAD OR WISHED YOU COULD GO TO SLEEP AND NOT WAKE UP?: YES
3. HAVE YOU BEEN THINKING ABOUT HOW YOU MIGHT KILL YOURSELF?: NO
1. IN THE PAST MONTH, HAVE YOU WISHED YOU WERE DEAD OR WISHED YOU COULD GO TO SLEEP AND NOT WAKE UP?: YES
6. HAVE YOU EVER DONE ANYTHING, STARTED TO DO ANYTHING, OR PREPARED TO DO ANYTHING TO END YOUR LIFE?: NO
TOTAL  NUMBER OF ABORTED OR SELF INTERRUPTED ATTEMPTS LIFETIME: NO
REASONS FOR IDEATION PAST MONTH: MOSTLY TO END OR STOP THE PAIN (YOU COULDN'T GO ON LIVING WITH THE PAIN OR HOW YOU WERE FEELING)
4. HAVE YOU HAD THESE THOUGHTS AND HAD SOME INTENTION OF ACTING ON THEM?: NO
REASONS FOR IDEATION LIFETIME: MOSTLY TO END OR STOP THE PAIN (YOU COULDN'T GO ON LIVING WITH THE PAIN OR HOW YOU WERE FEELING)
5. HAVE YOU STARTED TO WORK OUT OR WORKED OUT THE DETAILS OF HOW TO KILL YOURSELF? DO YOU INTEND TO CARRY OUT THIS PLAN?: NO

## 2023-09-24 ASSESSMENT — ACTIVITIES OF DAILY LIVING (ADL)
ADLS_ACUITY_SCORE: 35

## 2023-09-24 NOTE — ED NOTES
DEC Consult Order placed. DEC assessment [facility] 09[facility]24/23 at 12:44 PM. Consult acknowledged and completed.     Mily Dupree LICSW

## 2023-09-24 NOTE — PROGRESS NOTES
"Pt presents today with Mom due to his suicidal thoughts. He reports waking up daily and having,\"scary thoughts, but I have no plan and would never do anything to hurt myself.\" He reports chronic passive SI \"for years.\" He has been med-complaint, and \"notices when he don't take them.\" He reports binge drinking, \"large amounts.\" Last drink was Friday, he denies any withdrawal symptoms, or any history of any. He has snorted cocaine since he was a freshman in College. He reports usage varies, he will use it, then he won't, but feels the usage is a problem. He denies HI or hallucinations. Search was complete, his belongings were placed in a locker-Mom took his phone, wallet, and meds home with her. Routine and tour of unit given, he is currently with a [facility].   "

## 2023-09-24 NOTE — PROGRESS NOTES
Pt discharged to home with Mom, AVS with medication changes and apt reviewed wit pt and Mom, copy sent with and belongings returned.

## 2023-09-24 NOTE — ED PROVIDER NOTES
"    History     Chief Complaint:  No chief complaint on file.       HPI   Glen Cardenas is a 22 year old male presenting with his mother with worsening suicidal ideation.  He does see a psychiatrist and is currently taking Lexapro and Abilify.  Has been taking these for several years, and is concerned that they are not helping.  He endorses alcohol use and cocaine.  He last used cocaine a week ago, and last alcoholic beverage was Friday.  He denies any HI.  He has no active suicidal plan.  In the past he would self-harm by striking himself, but he has not been doing this.  He is interested in empath.      Independent Historian:    Patient only    Review of External Notes:  NA      Medications:    ARIPiprazole (ABILIFY) 2 MG tablet  calcium carbonate (TUMS) 500 MG chewable tablet  CITALOPRAM HYDROBROMIDE PO  ranitidine (ZANTAC) 150 MG tablet        Past Medical History:    No past medical history on file.    Past Surgical History:    No past surgical history on file.       Physical Exam   Patient Vitals for the past 24 hrs:   BP Temp Temp src Pulse Resp SpO2 Height Weight   09/24/23 0733 134/67 97.7  F (36.5  C) Temporal 71 16 98 % 1.905 m (6' 3\") 89.4 kg (197 lb)        Physical Exam  General: No acute distress.  Head: No obvious trauma to head.  Eyes:  Conjunctivae clear.   Neck: Normal range of motion.   CV: Skin is well perfused, no cyanosis  Respiratory: Effort normal. No audible wheezing.  Gastrointestinal: Non-distended.  Musculoskeletal: Normal range of motion. No gross deformities.  Neuro: Alert. Moving all extremities appropriately.  Normal speech.    Skin: No rashes or lesions on exposed skin.  Psych: Suicidal ideation.  No homicidal ideation      Emergency Department Course       Laboratory:  Labs Ordered and Resulted from Time of ED Arrival to Time of ED Departure - No data to display     Procedures   NA    Emergency Department Course & Assessments:    PSS-3      Date and Time Over the past 2 weeks " have you felt down, depressed, or hopeless? Over the past 2 weeks have you had thoughts of killing yourself? Have you ever attempted to kill yourself? When did this last happen? User   09/24/23 0742 yes yes no --           C-SSRS ([place])      Date and Time Q1 Wished to be Dead (Past Month) Q2 Suicidal Thoughts (Past Month) Q3 Suicidal Thought Method Q4 Suicidal Intent without Specific Plan Q5 Suicide Intent with Specific Plan Q6 Suicide Behavior (Lifetime) Within the Past 3 Months? RETIRED: Level of Risk per Screen Screening Not Complete User   09/24/23 0742 yes yes no no no no -- -- --               Suicide assessment completed by mental health (D.E.C., LCSW, etc.)    Interventions:  Medications - No data to display     Assessments:  0750 -initial evaluation and assessment of patient    Independent Interpretation (X-rays, CTs, rhythm strip):  None    Consultations/Discussion of Management or Tests:  None       Social Determinants of Health affecting care:  None     Disposition:  The patient was transferred to [facility].     Impression & Plan        Medical Decision Making:  Patient is calm and cooperative on interview.  He is here voluntarily, and is interested in [facility].  He has no medical conditions, and he is medically cleared.  He is transferred to [facility] in stable condition.      Diagnosis:    ICD-10-CM    1. Suicidal ideation  R45.851       2. Substance use  F19.90            Discharge Medications:  New Prescriptions    No medications on file          Jarad Marin MD  9/24/2023   Jarad Marin MD Peery, Stephen, MD  09/24/23 2030

## 2023-09-24 NOTE — ED PROVIDER NOTES
"EmPATH Unit - Psychiatric Consultation  [facility] Emergency Department    Glen Cardenas MRN: [id number]   Age: 22 year old YOB: 2000     History   No chief complaint on file.    HPI  Glen Cardenas is a 22 year old male with history notable for depression, anxiety, and polysubstance abuse including recent cocaine, cannabis, and alcohol use who presented to the emergency department with increasing depression, mood lability, and passive suicidal thoughts. In the emergency department, this patient was determined to be medically stable and transferred to the EmPATH unit for psychiatric assessment.  Record review indicates that he participated in PHP at [facility] December 2020. They have currently been in the emergency department for 5 hours.     Per nursing staff, Serafin became increasingly agitated as he wanted to leave yet had not met with a provider yet. He declined prn medications. Upon approach, Serafin was sitting in a recliner and was agreeable to meet with me. Serafin notes numerous psychosocial stressors including a recent break-up with his partner on 8/1. He lost a majority of his social supports during this break-up as well which he notes has been very challenging. 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 He endorses cannabis, alcohol, and cocaine use. Last use was Friday into Saturday. He feels as though his increasing depressive thoughts and anxiety are not impacted by substance use. He has passive suicidal thoughts from time to time, denies plans, denies intent. He uses substances to make himself \"feel better\" yet notes this is only temporary. He has been taking lexapro and abilify for 2 years and is unsure how helpful they are over the past 2 months. He notes that he's concerned about medication side effects and ideally would prefer to not take medications. Record review indicates " "that he has only tried SSRIs. I discussed considering switching to another medication class and or further augmenting his current medication regimen. He does endorse symptoms of bharath but only in the context of cocaine use. Since arriving at [facility], he denies suicidal thoughts and is requesting to discharge. He would like a referral to a new outpatient psych med provider as he does not feel connected to his current one. He has been seeing a therapist and is trying to see them weekly. He declines SUDs supports at this time. He identifies his family and coworkers as his main supports. At time of discharge there were no acute safety concerns.    Past Medical History  No past medical history on file.  No past surgical history on file.  ARIPiprazole (ABILIFY) 5 MG tablet  calcium carbonate (TUMS) 500 MG chewable tablet  escitalopram (LEXAPRO) 20 MG tablet      No Known Allergies  Family History  Family History   Problem Relation Age of Onset    Depression Maternal Aunt     Anxiety Disorder Maternal Aunt     Substance Abuse Maternal Aunt      Social History   Social History     Tobacco Use    Smoking status: Former     Types: Vaping Device, Cigarettes    Smokeless tobacco: Never    Tobacco comments:     smokes e-cig   Substance Use Topics    Alcohol use: Yes     Comment: occasional     Drug use: Yes     Types: Marijuana     Comment: daily          Review of Systems  A medically appropriate review of systems was performed with pertinent positives and negatives noted in the HPI, and all other systems negative.    Physical Examination   BP: 134/67  Pulse: 71  Temp: 97.7  F (36.5  C)  Resp: 16  Height: 190.5 cm (6' 3\")  Weight: 89.4 kg (197 lb)  SpO2: 98 %    Physical Exam  General: Appears stated age.   Neuro: Alert and fully oriented. Extremities appear to demonstrate normal strength on visual inspection.   Integumentary/Skin: no rash visualized, normal color    Psychiatric Examination   Appearance: awake, alert, adequately " groomed, appeared as age stated, and casually dressed  Attitude:  cooperative  Eye Contact:  good  Mood:  anxious  Affect:  mood congruent  Speech:  clear, coherent and normal prosody  Psychomotor Behavior:  no evidence of tardive dyskinesia, dystonia, or tics and intact station, gait and muscle tone  Thought Process:  logical  Associations:  no loose associations  Thought Content:  no evidence of suicidal ideation or homicidal ideation, no evidence of psychotic thought, no auditory hallucinations present, and no visual hallucinations present  Insight:  fair  Judgement:  fair  Oriented to:  time, person, and place  Attention Span and Concentration:  fair  Recent and Remote Memory:  fair  Language: able to name/identify objects without impairment  Fund of Knowledge: intact with awareness of current and past events    ED Course        Labs Ordered and Resulted from Time of ED Arrival to Time of ED Departure - No data to display    Assessments & Plan (with Medical Decision Making)   Patient presenting with increasing depressive thoughts, mood lability, and passive suicidal ideation further complicated by polysubstance abuse and psychosocial stressors. Patient endorses ongoing cannabis, cocaine, and alcohol use with [facility] Patient notes recent break-up with partner on 8/1 and subsequent loss of numerous social supports. Patient has been taking lexapro and abilify for 3+ years and is unsure if these continue to be helpful although endorses mood is further impacted by polysubstance use. Treatment plan focused on medications further targeting depression and mood lability. Patient has tried several SSRIs yet has not tried SNRI, consider switching medication classes in the future following period of sobriety once presentation can be more accurately assessed. Patient would benefit from increased frequency of psychotherapy and ongoing med management. Patient requested referral for mew  psych med provider. Patient declined SUDs resources yet understands recommendations to abstain from use. Patient requesting to discharge and return to outpatient services. Nursing notes reviewed noting no acute issues.     I have reviewed the assessment completed by the [facility].     Preliminary diagnosis:    ICD-10-CM    1. Suicidal ideation  R45.851       2. Major depressive disorder, recurrent episode, moderate with anxious distress (H)  F33.1       3. Polysubstance abuse (H)  F19.10            Treatment Plan:  -Continue Lexapro 20mg daily targeting depression/anxiety  -Increase Abilify from 2mg to 5mg further targeting mood lability and augmenting lexapro; 2 week supply provided. Follow-up with new provider for ongoing management   -Referral to establish care with new outpatient psychiatric med provider  -Recommendation to abstain from substance use, patient declined additional outpatient supports and resources   -Anticipate resuming outpatient psychotherapy with established provider, recommendation to increase frequency of appointments to weekly given psychosocial stressors   -Problem focused supportive therapy and education provided today related to patient's current and acute stressors, symptoms, and diagnoses.  -Discharge home with outpatient supports and crisis resources     --  KELLI Galvan CNP   [facility] EMERGENCY DEPT  EmPATH Unit       Lenka Choudhary APRN CNP  09/24/23 3080

## 2023-09-24 NOTE — PROGRESS NOTES
Pt met with the Provider and will discharge to home with Mom. He continues to be restless, tense , and paces the unit. He is cooperative, and declines PRN meds, but will take some nicotine gum while waiting.

## 2023-09-24 NOTE — ED NOTES
Completed by Lily KLINE    [facility]  ED to EMPATH Checklist:      Goal for EMPATH: Depression management    Current Behavior: Cooperative    Safety Concerns: Drug use reported     Legal Hold Status: Voluntary    Medically Cleared by ED provider: Yes    Patient Therapeutically Searched: pt searched by RN    Belongings: Remain with patient    Independent Ambulation at Baseline: Yes/No: Yes    Participates in Care/Conversation: Yes/No: Yes    Patient Informed about EMPATH: Yes/No: Yes    DEC: Ordered and pending    Patient Ready to be Transferred to EMPATH? Yes/No: Yes

## 2023-09-24 NOTE — PROGRESS NOTES
Pt had been restless, he is pleasant and cooperative, declines PRN meds offered, watches Football Game to try to stay calm.

## 2023-09-24 NOTE — DISCHARGE INSTRUCTIONS
In-person MM    Yoselin Saxena [facility]  [address] [phone] 9/27/2023 2:00 pm    Patient Instructions    THIS IS ONLY A RESERVATION. PATIENT MUST CALL [phone] TO PRE-REGISTER AND CONFIRM APPOINTMENT. Please arrive 15 min early with ID and insurance card. Insurance accepted: [place], PMAPs, commercial insurance. If pt has no insurance, we have a MNSure Navigator available to assist in applying for state insurance. We have 3 locations - intake appts available in [place] or [place], depending on day/time of week. Please call [phone] to verify location and pre-register to confirm appt.        Aftercare Plan  If I am feeling unsafe or I am in a crisis, I will:   Contact my established care providers   Call the National Suicide Prevention Lifeline: 988  Go to the nearest emergency room   Call 911     Warning signs that I or other people might notice when a crisis is developing for me: I'm quiet, isolate more.  I get more irritable.      Things I am able to do on my own to cope or help me feel better: Gym, basketball, drumming.     Things that I am able to do with others to cope or help me better: Hang out with friends, talk, sports.     Things I can use or do for distraction: Watch tv.     Changes I can make to support my mental health and wellness: Stop using drugs and alcohol.  Consider going to AA and getting a sponsor.     People in my life that I can ask for help: Mom, dad, friends, sister.     Your [facility] has a mental health crisis team you can call 24/7: [facility] Crisis  [phone]    Other things that are important when I'm in crisis: None.     Additional resources and information:  See discharge paperwork for your psychiatry appointment information.        Crisis Lines  Crisis Text Line  Text 944057  You will be connected with a trained live crisis counselor to provide support.    Por landon, texto  TIAGO a 615182 o texto a 442-AYUDAME en WhatsApp    The Shailesh  "Project (LGBTQ Youth Crisis Line)  4.588.766.3532  text START to 760-295      Community Resources  Fast Tracker  Linking people to mental health and substance use disorder resources  [employer]     [place] Mental Health Warm Line  Peer to peer support  Monday thru Saturday, 12 pm to 10 pm  742.139.2241 or 9.420.738.2421  Text \"Support\" to 78413    National [place] on Mental Illness (SANDY)  326.989.5199 or 1.888.SANDY.HELPS      Mental Health Apps  My3  https://SCP Events.org/    VirtualHopeBox  https://[employer]/apps/virtual-hope-box/      Additional Information  Today you were seen by a licensed mental health professional through Triage and Transition services, Behavioral Healthcare Providers ([facility])  for a crisis assessment in the Emergency Department at [facility].  It is recommended that you follow up with your established providers (psychiatrist, mental health therapist, and/or primary care doctor - as relevant) as soon as possible. Coordinators from [facility] will be calling you in the next 24-48 hours to ensure that you have the resources you need.  You can also contact [facility] coordinators directly at [phone]. You may have been scheduled for or offered an appointment with a mental health provider. [facility] maintains an extensive network of licensed behavioral health providers to connect patients with the services they need.  We do not charge providers a fee to participate in our referral network.  We match patients with providers based on a patient's specific needs, insurance coverage, and location.  Our first effort will be to refer you to a provider within your care system, and will utilize providers outside your care system as needed.      "

## 2023-09-24 NOTE — ED TRIAGE NOTES
Pt. On psych meds, states he has increased stressors, using drugs more which is increasing depression and anxiety. Pt states has suicidal thoughts without a plan. Pt. Using alcohol, last drink on Friday night. Pt. Here voluntary with Mom.      Triage Assessment       Row Name 09/24/23 0772       Triage Assessment (Adult)    Airway WDL WDL       Respiratory WDL    Respiratory WDL WDL       Skin Circulation/Temperature WDL    Skin Circulation/Temperature WDL WDL       Cardiac WDL    Cardiac WDL WDL       Peripheral/Neurovascular WDL    Peripheral Neurovascular WDL WDL       Cognitive/Neuro/Behavioral WDL    Cognitive/Neuro/Behavioral WDL X;mood/behavior  increased stressors, depression and anxiety

## 2023-09-24 NOTE — PROGRESS NOTES
"Pt has been calling his Mom, and she made a call to us that pt was calling her upset he is not being seen. Explained the process to Mom and pt, pt has been pacing and becomes agitated with writer when told him he needed to wait his turn. \"Then I can I just leave, I don't like the attitude your giving me.\" His behavior has become agitated, as, when he got here, he was calm.  Provider did go see pt and is with him now.  "

## 2023-09-24 NOTE — CONSULTS
"Diagnostic Evaluation Consultation  Crisis Assessment    Patient Name: Glen Cardenas  Age:  22 year old  Legal Sex: male  Gender Identity: male  Pronouns: he/him/his  Race: White  Ethnicity: Not  or   Language: English      Patient was assessed: In person      Patient location: [facility] EMERGENCY DEPT                             [id number]    Referral Data and Chief Complaint  Glen Cardenas presents to the ED with family/friends (Pt was brought in by his mother.). Patient is presenting to the ED for the following concerns: Depression, Suicidal ideation, Substance use.   Factors that make the mental health crisis life threatening or complex are:  Worsening depression with suicidal ideation coupled with cocaine, marijuana and alcohol abuse..      Informed Consent and Assessment Methods  Explained the crisis assessment process, including applicable information disclosures and limits to confidentiality, assessed understanding of the process, and obtained consent to proceed with the assessment.  Assessment methods included conducting a formal interview with patient, review of medical records, collaboration with medical staff, and obtaining relevant collateral information from family and community providers when available.  : done     Patient response to interventions: acceptance expressed  Coping skills were attempted to reduce the crisis:  bubble bath, hot water, sleep, nap, listen to music.     History of the Crisis   Pt reports he has really been struggling with depression and anxiety for the past month. His girlfriend broke up with him on August 1st. When he reached out to her this weekend to meet for coffee and she said no, he called his mom and said he needed help. He reports passive SI, worsening depression and unmanageable anxiety. His drug and alcohol use have exacerbated his MDD and JOSÉ LUIS symptoms.  Pt reports he gets \"immobilized, stuck, manic, pacing that can go on for " "hours\".    Brief Psychosocial History  Family:  Single, Children no  Support System:  Parent(s), Sibling(s), Other (specify)  Employment Status:  employed part-time  Source of Income:  salary/wages  Financial Environmental Concerns:     Current Hobbies:  exercise/fitness, family functions, social media/computer activities, group/social activities, outdoor activities, music, television/movies/videos  Barriers in Personal Life:  mental health concerns    Significant Clinical History  Current Anxiety Symptoms:  racing thoughts, anxious  Current Depression/Trauma:  difficulty concentrating, withdrawl/isolation, thoughts of death/suicide, hoplessness, helplessness, sadness  Current Somatic Symptoms:  somatic symptoms (abdominal pain, headache, tension), racing thoughts, anxious  Current Psychosis/Thought Disturbance:     Current Eating Symptoms:     Chemical Use History:  Alcohol: Binge  Last Use:: 09/23/23  Benzodiazepines: None  Opiates: None  Cocaine: Smoked  Last Use:: 09/16/23  Marijuana: Other (comments) (\"a few times a week\")  Last Use:: 09/22/23  Other Use: None   Past diagnosis:  ADHD, Anxiety Disorder, Depression  Family history:  Anxiety Disorder, Substance Use Disorder, Depression  Past treatment:  Individual therapy, Psychiatric Medication Management, Primary Care, Inpatient Hospitalization  Details of most recent treatment:  Pt denies any history of CD tx.  Other relevant history:  One previous admit per pt; at [place] in 2020.       Collateral Information  Is there collateral information: Yes     Collateral information name, relationship, phone number:  Heather Cardenas, mother [phone]    What happened today: Yesterday Serafin had a meltdown.  He called me at my boyfriends house crying; he was upset his ex girlfriend would not meet him for coffee.  \"I can't do this anymore.\"  \"I really need help.\"     What is different about patient's functioning: Mom is aware pt has been using cocaine, marijuana and " drinking.     Concern about alcohol/drug use:      What do you think the patient needs:      Has patient made comments about wanting to kill themselves/others: yes    If d/c is recommended, can they take part in safety/aftercare planning:  yes    Additional collateral information:        Risk Assessment  Routt Suicide Severity Rating Scale Full Clinical Version:  Suicidal Ideation  3. Active Suicidal Ideation with any Methods (Not Plan) Without Intent to Act (Lifetime): No  Q4 Active Suicidal Ideation with Some Intent to Act, Without Specific Plan (Lifetime): No  Q5 Active Suicidal Ideation with Specific Plan and Intent (Lifetime): No  Q6 Suicide Behavior (Lifetime): no     Suicidal Behavior (Lifetime)  Actual Attempt (Lifetime): No  Has subject engaged in non-suicidal self-injurious behavior? (Lifetime): No  Interrupted Attempts (Lifetime): No  Aborted or Self-Interrupted Attempt (Lifetime): No  Preparatory Acts or Behavior (Lifetime): No    Routt Suicide Severity Rating Scale Recent:   Suicidal Ideation (Recent)  Q1 Wished to be Dead (Past Month): no  Q2 Suicidal Thoughts (Past Month): yes  Q3 Suicidal Thought Method: no  Q4 Suicidal Intent without Specific Plan: no  Q5 Suicide Intent with Specific Plan: no  Level of Risk per Screen: low risk  Intensity of Ideation (Recent)  Most Severe Ideation Rating (Past 1 Month): 4  Frequency (Past 1 Month): Daily or almost daily  Duration (Past 1 Month): 1-4 hours/a lot of time  Controllability (Past 1 Month): Can control thoughts with a lot of difficulty  Deterrents (Past 1 Month): Deterrents definitely stopped you from attempting suicide  Reasons for Ideation (Past 1 Month): Mostly to end or stop the pain (You couldn't go on living with the pain or how you were feeling)       Environmental or Psychosocial Events: loss of a relationship due to divorce/separation, helplessness/hopelessness, ongoing abuse of substances  Protective Factors: Protective Factors: strong  bond to family unit, community support, or employment, lives in a responsibly safe and stable environment, good treatment engagement, responsibilities and duties to others, including pets and children, sense of importance of health and wellness, able to access care without barriers, supportive ongoing medical and mental health care relationships, help seeking    Does the patient have thoughts of harming others? Feels Like Hurting Others: no  Previous Attempt to Hurt Others: no    Is the patient engaging in sexually inappropriate behavior?           Mental Status Exam   Affect: Flat  Appearance: Disheveled  Attention Span/Concentration: Attentive  Eye Contact: Engaged    Fund of Knowledge: Appropriate   Language /Speech Content: Fluent  Language /Speech Volume: Normal  Language /Speech Rate/Productions: Articulate  Recent Memory: Intact  Remote Memory: Intact  Mood: Depressed  Orientation to Person: Yes   Orientation to Place: Yes  Orientation to Time of Day: Yes  Orientation to Date: Yes     Situation (Do they understand why they are here?): Yes  Psychomotor Behavior: Normal  Thought Content: Suicidal  Thought Form: Goal Directed, Intact     Mini-Cog Assessment  Number of Words Recalled:    Clock-Drawing Test:     Three Item Recall:    Mini-Cog Total Score:       Medication  Psychotropic medications:   Medication Orders - Psychiatric (From admission, onward)      Start     Dose/Rate Route Frequency Ordered Stop    09/24/23 1234  nicotine (NICORETTE) gum 2 mg         2 mg Buccal EVERY 1 HOUR PRN 09/24/23 1234      09/24/23 0000  ARIPiprazole (ABILIFY) 5 MG tablet         5 mg Oral DAILY 09/24/23 1232               Current Care Team  Patient Care Team:  Edwin Lanier MD as PCP - General (Internal Medicine)  Edwin Lanier MD as Assigned PCP    Diagnosis  Patient Active Problem List   Diagnosis Code    Major depressive disorder, recurrent episode, moderate with anxious distress (H) F33.1    Generalized anxiety  disorder F41.1    Cocaine abuse (H) F14.10    Cannabis abuse F12.10    Alcohol abuse, episodic drinking behavior F10.10       Primary Problem This Admission  Active Hospital Problems    Generalized anxiety disorder      Cannabis abuse      Alcohol abuse, episodic drinking behavior      Cocaine abuse (H)        Clinical Summary and Substantiation of Recommendations   Pt is not an imminent threat to himself or others.  Recommend CD assessment which pt declines.  Recommend continued outpatient follow up with therapist.  Pt wishes to establish care with a new psychiatry provider.  An appointment will be scheduled on his behalf.    Patient coping skills attempted to reduce the crisis:  bubble bath, hot water, sleep, nap, listen to music.    Disposition  Recommended disposition: Individual Therapy, Medication Management        Reviewed case and recommendations with attending provider. Attending Name: Lenka Choudhary       Attending concurs with disposition: yes       Patient and/or validated legal guardian concurs with disposition:   yes       Final disposition:  discharge    Legal status on admission:      Assessment Details   Total duration spent on the patient case in minutes: 60 min     CPT code(s) utilized: 23934 - Psychotherapy for Crisis - 60 (30-74*) min    Ambreen Garza [facility], Psychotherapist  DEC - Triage & Transition Services  Callback: [phone]    Aftercare Plan  If I am feeling unsafe or I am in a crisis, I will:   Contact my established care providers   Call the National Suicide Prevention Lifeline: 988  Go to the nearest emergency room   Call 102     Warning signs that I or other people might notice when a crisis is developing for me: I'm quiet, isolate more.  I get more irritable.      Things I am able to do on my own to cope or help me feel better: Gym, basketball, drumming.     Things that I am able to do with others to cope or help me better: Hang out with friends, talk, sports.     Things I  "can use or do for distraction: Watch tv.     Changes I can make to support my mental health and wellness: Stop using drugs and alcohol.  Consider going to AA and getting a sponsor.     People in my life that I can ask for help: Mom, dad, friends, sister.     Your [facility] has a mental health crisis team you can call 24/7: [facility] Crisis  [phone]    Other things that are important when I'm in crisis: None.     Additional resources and information:  See discharge paperwork for your psychiatry appointment information.        Crisis Lines  Crisis Text Line  Text 821700  You will be connected with a trained live crisis counselor to provide support.    Por espanol, texto  TIAGO a 145368 o texto a 442-AYUDAME en WhatsApp    The Shailesh Project (LGBTQ Youth Crisis Line)  4.599.582.5866  text START to 919-301      Latio  Fast Tracker  Linking people to mental health and substance use disorder resources  WedPics (deja mi)n.org     [place] Mental Health Warm Line  Peer to peer support  Monday thru Saturday, 12 pm to 10 pm  653.060.2605 or 5.369.994.8127  Text \"Support\" to 39946    National [place] on Mental Illness (SANDY)  505.886.1376 or 1.888.SANDY.HELPS      Mental Health Apps  My3  https://myEpiGaNpp.org/    VirtualHopeBox  https://[employer].org/apps/virtual-hope-box/      Additional Information  Today you were seen by a licensed mental health professional through Triage and Transition services, Behavioral Healthcare Providers (P)  for a crisis assessment in the Emergency Department at [facility].  It is recommended that you follow up with your established providers (psychiatrist, mental health therapist, and/or primary care doctor - as relevant) as soon as possible. Coordinators from [facility] will be calling you in the next 24-48 hours to ensure that you have the resources you need.  You can also contact [facility] coordinators directly at [phone]. You may have been scheduled for or " offered an appointment with a mental health provider. [facility] maintains an extensive network of licensed behavioral health providers to connect patients with the services they need.  We do not charge providers a fee to participate in our referral network.  We match patients with providers based on a patient's specific needs, insurance coverage, and location.  Our first effort will be to refer you to a provider within your care system, and will utilize providers outside your care system as needed.

## 2023-09-24 NOTE — CONSULTS
DEC Consult Order placed. DEC [facility] at 12:44 PM. Consult acknowledged and completed.     Mily Dupree LICSW

## 2023-09-24 NOTE — PROGRESS NOTES
[facility] Progress Note    Client Name: Glen Cardenas  Date: September 24, 2023  Service Type:  Group Therapy  Facilitator:me@        Response:  Patient did not participate in group.      Ambreen Garza, LISSSW

## 2023-11-27 ENCOUNTER — TELEPHONE (OUTPATIENT)
Dept: BEHAVIORAL HEALTH | Facility: CLINIC | Age: 23
End: 2023-11-27
Payer: COMMERCIAL

## 2023-11-27 NOTE — TELEPHONE ENCOUNTER
Pt is a(n) adult (18+ out of HS) Seeking as eval for Adult GUZMAN Assessment for primary substance use treatment (OP GUZMAN programs including Co-occurring).  Appointment scheduled by:  Parent/Guardian (Guardianship confirmed, run cost estimate.  If not, do not run)  Caller name:  Charles Cardenas    Caller phone #: [phone]  Legal Guardianship Reviewed?  No  Honoring Choices Notified?  No  Brief reason for appt:       needed?  NO    Contact information verified/updated: Yes    Maria Fernanda Cantu

## 2023-11-29 ENCOUNTER — TELEPHONE (OUTPATIENT)
Dept: BEHAVIORAL HEALTH | Facility: CLINIC | Age: 23
End: 2023-11-29

## 2023-11-29 NOTE — TELEPHONE ENCOUNTER
Substance Use Evaluation Intake Form    Demographic Information  Patient's legal name:  Glen Cardenas  Patient's preferred/chosen name: Glen  Patient's pronouns: He/Him  Last 4 of SSN: [id number]  Insurance: [facility] Individual Family Plans  PMI: N/A  Date of eval: 12/5/2023    Emergency contact: [phone] ruthie Romero  Does the patient have a legal guardian (if so, phone number for guardian about placement): No  Applicable guardianship and decision making information was sent to honoring choices: No  Does the patient have a psychiatric advance directive: No    Appointment Information  Who is recommending/reason for appointment: self   / HPSP / other entity that might need assessment: no    Drug of Choice: did not ask  Have you ever had a GUZMAN assessment (when/where): yes, unsure  Any GUZMAN treatment history: no    Service information  Primary Care Provider: no   Mental Health Providers: therapist  Are there MH concerns: Yes, depression and anxiety  Where is the patient in the care system: Pt is in the community and self referred.  Has consent been obtained for Care Everywhere: No    Advised patient/guardian that appt may take up to 120 min: Yes  Reminder to arrive 15 minutes early: Yes  Is there an active my chart: Yes  Have pre-visit forms been sent: No

## 2023-11-29 NOTE — TELEPHONE ENCOUNTER
11/29/2023    Pt called writer back requesting to cancel appointment d/t conflicting appointments. Writer cancelled appointment.    Sari Mckeon, Patient Navigator  [phone]

## 2024-05-08 ENCOUNTER — OFFICE VISIT (OUTPATIENT)
Dept: FAMILY MEDICINE | Facility: CLINIC | Age: 24
End: 2024-05-08
Payer: COMMERCIAL

## 2024-05-08 VITALS
SYSTOLIC BLOOD PRESSURE: 132 MMHG | DIASTOLIC BLOOD PRESSURE: 76 MMHG | OXYGEN SATURATION: 97 % | TEMPERATURE: 98.3 F | WEIGHT: 199.1 LBS | RESPIRATION RATE: 13 BRPM | BODY MASS INDEX: 26.39 KG/M2 | HEART RATE: 66 BPM | HEIGHT: 73 IN

## 2024-05-08 DIAGNOSIS — Z11.3 ENCNTR SCREEN FOR INFECTIONS W SEXL MODE OF TRANSMISS: ICD-10-CM

## 2024-05-08 DIAGNOSIS — Z11.4 SCREENING FOR HIV (HUMAN IMMUNODEFICIENCY VIRUS): ICD-10-CM

## 2024-05-08 DIAGNOSIS — Z00.00 ROUTINE ADULT HEALTH MAINTENANCE: Primary | ICD-10-CM

## 2024-05-08 LAB
ERYTHROCYTE [DISTWIDTH] IN BLOOD BY AUTOMATED COUNT: 12.8 % (ref 10–15)
HCT VFR BLD AUTO: 43.9 % (ref 40–53)
HGB BLD-MCNC: 14.4 G/DL (ref 13.3–17.7)
MCH RBC QN AUTO: 29.4 PG (ref 26.5–33)
MCHC RBC AUTO-ENTMCNC: 32.8 G/DL (ref 31.5–36.5)
MCV RBC AUTO: 90 FL (ref 78–100)
PLATELET # BLD AUTO: 329 10E3/UL (ref 150–450)
RBC # BLD AUTO: 4.9 10E6/UL (ref 4.4–5.9)
WBC # BLD AUTO: 6.6 10E3/UL (ref 4–11)

## 2024-05-08 PROCEDURE — 80048 BASIC METABOLIC PNL TOTAL CA: CPT | Performed by: PHYSICIAN ASSISTANT

## 2024-05-08 PROCEDURE — 87389 HIV-1 AG W/HIV-1&-2 AB AG IA: CPT | Performed by: PHYSICIAN ASSISTANT

## 2024-05-08 PROCEDURE — 99395 PREV VISIT EST AGE 18-39: CPT | Performed by: PHYSICIAN ASSISTANT

## 2024-05-08 PROCEDURE — 87491 CHLMYD TRACH DNA AMP PROBE: CPT | Performed by: PHYSICIAN ASSISTANT

## 2024-05-08 PROCEDURE — 87591 N.GONORRHOEAE DNA AMP PROB: CPT | Performed by: PHYSICIAN ASSISTANT

## 2024-05-08 PROCEDURE — 36415 COLL VENOUS BLD VENIPUNCTURE: CPT | Performed by: PHYSICIAN ASSISTANT

## 2024-05-08 PROCEDURE — 86780 TREPONEMA PALLIDUM: CPT | Performed by: PHYSICIAN ASSISTANT

## 2024-05-08 PROCEDURE — 85027 COMPLETE CBC AUTOMATED: CPT | Performed by: PHYSICIAN ASSISTANT

## 2024-05-08 SDOH — HEALTH STABILITY: PHYSICAL HEALTH: ON AVERAGE, HOW MANY DAYS PER WEEK DO YOU ENGAGE IN MODERATE TO STRENUOUS EXERCISE (LIKE A BRISK WALK)?: 5 DAYS

## 2024-05-08 SDOH — HEALTH STABILITY: PHYSICAL HEALTH: ON AVERAGE, HOW MANY MINUTES DO YOU ENGAGE IN EXERCISE AT THIS LEVEL?: 60 MIN

## 2024-05-08 ASSESSMENT — SOCIAL DETERMINANTS OF HEALTH (SDOH): HOW OFTEN DO YOU GET TOGETHER WITH FRIENDS OR RELATIVES?: MORE THAN THREE TIMES A WEEK

## 2024-05-08 ASSESSMENT — PATIENT HEALTH QUESTIONNAIRE - PHQ9
SUM OF ALL RESPONSES TO PHQ QUESTIONS 1-9: 8
SUM OF ALL RESPONSES TO PHQ QUESTIONS 1-9: 8
10. IF YOU CHECKED OFF ANY PROBLEMS, HOW DIFFICULT HAVE THESE PROBLEMS MADE IT FOR YOU TO DO YOUR WORK, TAKE CARE OF THINGS AT HOME, OR GET ALONG WITH OTHER PEOPLE: NOT DIFFICULT AT ALL

## 2024-05-08 ASSESSMENT — PAIN SCALES - GENERAL: PAINLEVEL: NO PAIN (0)

## 2024-05-08 NOTE — PROGRESS NOTES
"Preventive Care Visit  [facility]  Erica Leon PA-C, Physician Assistant - Medical  May 8, 2024      Assessment & Plan     Routine adult health maintenance    - REVIEW OF HEALTH MAINTENANCE PROTOCOL ORDERS  - CBC with platelets  - Basic metabolic panel  - CBC with platelets  - Basic metabolic panel    Encntr screen for infections w sexl mode of transmiss  - Chlamydia trachomatis/Neisseria gonorrhoeae by PCR  - Treponema Abs w Reflex to RPR and Titer  - Treponema Abs w Reflex to RPR and Titer    Screening for HIV (human immunodeficiency virus)  - HIV Antigen Antibody Combo Cascade  - HIV Antigen Antibody Combo Cascade      BMI  Estimated body mass index is 26.45 kg/m  as calculated from the following:    Height as of this encounter: 1.848 m (6' 0.75\").    Weight as of this encounter: 90.3 kg (199 lb 1.6 oz).     Counseling  Appropriate preventive services were discussed with this patient, including applicable screening as appropriate for fall prevention, nutrition, physical activity, Tobacco-use cessation, weight loss and cognition.  Checklist reviewing preventive services available has been given to the patient.  Reviewed patient's diet, addressing concerns and/or questions.   The patient reports drinking more than one alcoholic drink per day and sometimes engages in binge or excessive drinking. The patient was counseled and given information about possible harmful effects of excessive alcohol intake as well as where to get help for alcohol problems. The patient's PHQ-9 score is consistent with mild depression. He was provided with information regarding depression.           Pollo SWARTZ is a 23 year old, presenting for the following:  Abel Swartz here today for M    Sees psychiatry, therapy  Mental health is stable        5/8/2024    12:44 PM   Additional Questions   Roomed by Na PEREIRA      Health Care Directive  Patient does not have a Health Care Directive or Living Will: " Discussed advance care planning with patient; however, patient declined at this time.    HPI      5/8/2024   General Health   How would you rate your overall physical health? Good   Feel stress (tense, anxious, or unable to sleep) Rather much   (!) STRESS CONCERN      5/8/2024   Nutrition   Three or more servings of calcium each day? Yes   Diet: Regular (no restrictions)   How many servings of fruit and vegetables per day? 4 or more   How many sweetened beverages each day? 0-1         5/8/2024   Exercise   Days per week of moderate/strenous exercise 5 days   Average minutes spent exercising at this level 60 min         5/8/2024   Social Factors   Frequency of gathering with friends or relatives More than three times a week   Worry food won't last until get money to buy more No   Food not last or not have enough money for food? No   Do you have housing?  Yes   Are you worried about losing your housing? No   Lack of transportation? No   Unable to get utilities (heat,electricity)? No         5/8/2024   Dental   Dentist two times every year? Yes         5/8/2024   TB Screening   Were you born outside of the [place]? No     Today's PHQ-9 Score:       5/8/2024    12:37 PM   PHQ-9 SCORE   PHQ-9 Total Score MyChart 8 (Mild depression)   PHQ-9 Total Score 8         5/8/2024   Substance Use   Alcohol more than 3/day or more than 7/wk Yes   How often do you have a drink containing alcohol 2 to 3 times a week   How many alcohol drinks on typical day 5 or 6   How often do you have 5+ drinks at one occasion Monthly   Audit 2/3 Score 4   How often not able to stop drinking once started Never   How often failed to do what normally expected Never   How often needed first drink in am after a heavy drinking session Never   How often feeling of guilt or remorse after drinking Less than monthly   How often unable to remember what happened the night before Less than monthly   Have you or someone else been injured because of your drinking No  "  Has anyone been concerned or suggested you cut down on drinking No   TOTAL SCORE - AUDIT 9   Do you use any other substances recreationally? (!) ALCOHOL    (!) CANNABIS PRODUCTS    (!) COCAINE     Social History     Tobacco Use    Smoking status: Former     Types: Vaping Device, Cigarettes    Smokeless tobacco: Never    Tobacco comments:     smokes e-cig   Vaping Use    Vaping status: Every Day   Substance Use Topics    Alcohol use: Yes     Comment: occasional     Drug use: Yes     Types: Marijuana     Comment: daily             5/8/2024   One time HIV Screening   Previous HIV test? No         5/8/2024   STI Screening   New sexual partner(s) since last STI/HIV test? (!) YES          5/8/2024   Contraception/Family Planning   Questions about contraception or family planning No        Reviewed and updated as needed this visit by Provider   Tobacco     Med Hx   Fam Hx                 Objective    Exam  /76 (BP Location: Right arm, Patient Position: Sitting, Cuff Size: Adult Regular)   Pulse 66   Temp 98.3  F (36.8  C) (Temporal)   Resp 13   Ht 1.848 m (6' 0.75\")   Wt 90.3 kg (199 lb 1.6 oz)   SpO2 97%   BMI 26.45 kg/m     Estimated body mass index is 26.45 kg/m  as calculated from the following:    Height as of this encounter: 1.848 m (6' 0.75\").    Weight as of this encounter: 90.3 kg (199 lb 1.6 oz).    Physical Exam  GENERAL: alert and no distress  EYES: Eyes grossly normal to inspection, PERRL and conjunctivae and sclerae normal  HENT: ear canals and TM's normal, nose and mouth without ulcers or lesions  NECK: no adenopathy, no asymmetry, masses, or scars  RESP: lungs clear to auscultation - no rales, rhonchi or wheezes  CV: regular rate and rhythm, normal S1 S2, no S3 or S4, no murmur, click or rub, no peripheral edema  ABDOMEN: soft, nontender, no hepatosplenomegaly, no masses and bowel sounds normal  MS: no gross musculoskeletal defects noted, no edema  SKIN: no suspicious lesions or " rashes  NEURO: Normal strength and tone, mentation intact and speech normal  PSYCH: mentation appears normal, affect normal/bright        Signed Electronically by: Erica Leon PA-C    Answers submitted by the patient for this visit:  Patient Health Questionnaire (Submitted on 5/8/2024)  If you checked off any problems, how difficult have these problems made it for you to do your work, take care of things at home, or get along with other people?: Not difficult at all  PHQ9 TOTAL SCORE: 8

## 2024-05-09 LAB
ANION GAP SERPL CALCULATED.3IONS-SCNC: 12 MMOL/L (ref 7–15)
BUN SERPL-MCNC: 15.3 MG/DL (ref 6–20)
C TRACH DNA SPEC QL PROBE+SIG AMP: NEGATIVE
CALCIUM SERPL-MCNC: 9.4 MG/DL (ref 8.6–10)
CHLORIDE SERPL-SCNC: 102 MMOL/L (ref 98–107)
CREAT SERPL-MCNC: 0.78 MG/DL (ref 0.67–1.17)
DEPRECATED HCO3 PLAS-SCNC: 26 MMOL/L (ref 22–29)
EGFRCR SERPLBLD CKD-EPI 2021: >90 ML/MIN/1.73M2
GLUCOSE SERPL-MCNC: 102 MG/DL (ref 70–99)
HIV 1+2 AB+HIV1 P24 AG SERPL QL IA: NONREACTIVE
N GONORRHOEA DNA SPEC QL NAA+PROBE: NEGATIVE
POTASSIUM SERPL-SCNC: 4.1 MMOL/L (ref 3.4–5.3)
SODIUM SERPL-SCNC: 140 MMOL/L (ref 135–145)
T PALLIDUM AB SER QL: NONREACTIVE

## 2024-12-09 NOTE — PROGRESS NOTES
Serafin comes in today for evaluation of fatigue along with follow-up of his anxiety.  I put him on Celexa at 20 mg per day at his last visit.  He is tolerating this well with no side effects.  He states that it was helping for little bit, but then he started to have recurrence of his anxiety.  He is also been having some fatigue over the last few months.  I am he states that he just has no energy throughout the day but does not feel the need to nap excessively.  He does not snore excessively either.  He denies any chest pain, shortness of breath, swelling in his legs, blood in his stool, melenic stools, or blood in his urine.  He has seen [facility] a couple of times over the last 2 months for abdominal pain.  He had an upper endoscopy and a colonoscopy which were unremarkable.  Biopsies were unremarkable as well.  He was started on a fiber regimen along with MiraLAX as he had a CT scan which showed moderate stool burden.  He states that he has felt better since he started this.    Objective: Vital signs are as per the EMR.  In general the patient is alert pleasant and in no acute distress.  He appears healthy.    Plan:    1.  Anxiety, under control.  Increase Celexa to 40 mg per day.  Follow-up in 1 month.  2.  Fatigue.  Check a CBC, CMP, TSH, vitamin D, Monospot, CRP, and sed rate.  I will call him with results and further recommendations.   normal balance

## 2025-06-21 ENCOUNTER — HEALTH MAINTENANCE LETTER (OUTPATIENT)
Age: 25
End: 2025-06-21